# Patient Record
Sex: FEMALE | Race: OTHER | HISPANIC OR LATINO
[De-identification: names, ages, dates, MRNs, and addresses within clinical notes are randomized per-mention and may not be internally consistent; named-entity substitution may affect disease eponyms.]

---

## 2019-02-01 ENCOUNTER — RECORD ABSTRACTING (OUTPATIENT)
Age: 49
End: 2019-02-01

## 2019-02-01 DIAGNOSIS — Z80.1 FAMILY HISTORY OF MALIGNANT NEOPLASM OF TRACHEA, BRONCHUS AND LUNG: ICD-10-CM

## 2019-02-01 DIAGNOSIS — Z87.59 PERSONAL HISTORY OF OTHER COMPLICATIONS OF PREGNANCY, CHILDBIRTH AND THE PUERPERIUM: ICD-10-CM

## 2019-02-01 DIAGNOSIS — Z82.3 FAMILY HISTORY OF STROKE: ICD-10-CM

## 2019-02-01 DIAGNOSIS — Z98.890 OTHER SPECIFIED POSTPROCEDURAL STATES: ICD-10-CM

## 2019-02-01 DIAGNOSIS — Z83.3 FAMILY HISTORY OF DIABETES MELLITUS: ICD-10-CM

## 2019-02-01 DIAGNOSIS — Z87.19 PERSONAL HISTORY OF OTHER DISEASES OF THE DIGESTIVE SYSTEM: ICD-10-CM

## 2019-02-01 DIAGNOSIS — Z82.49 FAMILY HISTORY OF ISCHEMIC HEART DISEASE AND OTHER DISEASES OF THE CIRCULATORY SYSTEM: ICD-10-CM

## 2019-02-01 LAB — CYTOLOGY CVX/VAG DOC THIN PREP: NORMAL

## 2019-02-05 ENCOUNTER — RX RENEWAL (OUTPATIENT)
Age: 49
End: 2019-02-05

## 2019-02-05 RX ORDER — BLOOD SUGAR DIAGNOSTIC
STRIP MISCELLANEOUS DAILY
Qty: 100 | Refills: 1 | Status: ACTIVE | COMMUNITY
Start: 2019-02-05 | End: 1900-01-01

## 2019-04-20 ENCOUNTER — LABORATORY RESULT (OUTPATIENT)
Age: 49
End: 2019-04-20

## 2019-04-22 ENCOUNTER — APPOINTMENT (OUTPATIENT)
Dept: ENDOCRINOLOGY | Facility: CLINIC | Age: 49
End: 2019-04-22
Payer: MEDICARE

## 2019-04-22 VITALS
WEIGHT: 220 LBS | HEIGHT: 66.5 IN | SYSTOLIC BLOOD PRESSURE: 124 MMHG | BODY MASS INDEX: 34.94 KG/M2 | HEART RATE: 76 BPM | DIASTOLIC BLOOD PRESSURE: 70 MMHG

## 2019-04-22 LAB — GLUCOSE BLDC GLUCOMTR-MCNC: 109

## 2019-04-22 PROCEDURE — 82962 GLUCOSE BLOOD TEST: CPT

## 2019-04-22 PROCEDURE — 99214 OFFICE O/P EST MOD 30 MIN: CPT | Mod: 25

## 2019-04-22 NOTE — PHYSICAL EXAM
[Alert] : alert [No Acute Distress] : no acute distress [Well Nourished] : well nourished [Well Developed] : well developed [Normal Sclera/Conjunctiva] : normal sclera/conjunctiva [EOMI] : extra ocular movement intact [Normal Oropharynx] : the oropharynx was normal [No Proptosis] : no proptosis [Thyroid Not Enlarged] : the thyroid was not enlarged [No Thyroid Nodules] : there were no palpable thyroid nodules [No Respiratory Distress] : no respiratory distress [Clear to Auscultation] : lungs were clear to auscultation bilaterally [No Accessory Muscle Use] : no accessory muscle use [Normal Rate] : heart rate was normal  [Normal S1, S2] : normal S1 and S2 [Regular Rhythm] : with a regular rhythm [Pedal Pulses Normal] : the pedal pulses are present [Normal Bowel Sounds] : normal bowel sounds [No Edema] : there was no peripheral edema [Soft] : abdomen soft [Not Tender] : non-tender [Not Distended] : not distended [Post Cervical Nodes] : posterior cervical nodes [Anterior Cervical Nodes] : anterior cervical nodes [Normal] : normal and non tender [Axillary Nodes] : axillary nodes [Spine Straight] : spine straight [No Spinal Tenderness] : no spinal tenderness [No Stigmata of Cushings Syndrome] : no stigmata of cushings syndrome [Normal Strength/Tone] : muscle strength and tone were normal [Normal Gait] : normal gait [Acanthosis Nigricans] : no acanthosis nigricans [No Rash] : no rash [Normal Reflexes] : deep tendon reflexes were 2+ and symmetric [No Tremors] : no tremors [Oriented x3] : oriented to person, place, and time

## 2019-04-22 NOTE — HISTORY OF PRESENT ILLNESS
[FreeTextEntry1] :   48 yo  Lady coming for f/u DM2, goiter\par        lost 6lb since last visit\par        labs reviewed better HgA1c, better LFTs\par        liver US and thyroid US reviewed\par        both her mother and father with DM2 on insulin\par        her mother had recent stroke and toe amputated sees Dr Mcmahan\par        had vomiting and diarrhea for 3 days had a stomach infection, better now , started last Saturday per pt \par        has 3 kids, had GDM with the first 2 pregnancies, \par        type 2 DM for 6 years since 2010 when she was admitted with sepsis + blood cultures, N/V at [facility] complicated by neuropathy s/p multiple surgeries for an infection in her right foot x2 years, resolved 7months ago\par        on\par        Janumet 50-1000mg 1tab bid with no side effects \par        Glipizide XL 5mg before dinner once a day \par        Feeling well, denies hypoglycemia. No side effects from medications. \par        Last A1c 5.7% on 10/2016, prior HgA1c on 10/15 was 5.8%, 4/2015 was 6.3% , 8/2014 was 8.3%, used to be 5.3-7.2%\par        Checks BS 2-3 times per week again no records, per pt :\par        175 highest after going out for dinner, 120 this morning per pt, lowest 98 the other day per pt \par        Microvascular complications: \par        Nephropathy: +1 proteins in the urine 8/2014, Cr 1.1 EGFR 54, microalb 178 ( <17), 5/16 EGFR 44, Cr 1.3, microalb 23, 10/2016: Cr 1.3, egfr 44 (10/16), microalb 10\par        Neuropathy, sees [facility] 10/2016 due in april , denies tingling, numbness, checks her feet every evening, had a flap x 3 done 3 years ago due to wrong hard brace per pt, none with her new brace \par        microfilament exam pt refused today again "I am fine and it is so hard to remove my prosthesis" , pt has prosthesis, sees Podiatry every 6 months \par        Retinopathy denies: last eye exam done 4/14/2015 Dr Taylor in [place], due to see him\par        Pt having dental work done this month, needs 6 teeth extracted and getting partial dentures. \par        Macrovascular complications: \par        HTN at goal not on meds\par        CAD/CVA denies Dr [facility] last seen 2yrs ago per pt same buiding with Dr Desouza her PCP , had also a Holter Monitor 2010 due to have a physical\par        Lipids at goal not on meds with LFT's normal\par        thyroid US 4/2015 right lower pole 3.5mm focal hypoechoic nodule\par        Thyroid US 10/2016 Right lower pole hypoechoic nodule measuring 3.4 x 3.4 x 1.9 mm unchanged and an adjacent similar sized but new hypoechoic nodule as well. \par        Left upper pole hypoechoic nodule measures 3.2 x 2.1 \par        x 1.1 mm. This is not significantly changed. \par        labs reviewed with the pt.

## 2019-05-28 ENCOUNTER — RX RENEWAL (OUTPATIENT)
Age: 49
End: 2019-05-28

## 2019-07-19 ENCOUNTER — LABORATORY RESULT (OUTPATIENT)
Age: 49
End: 2019-07-19

## 2019-07-22 ENCOUNTER — APPOINTMENT (OUTPATIENT)
Dept: ENDOCRINOLOGY | Facility: CLINIC | Age: 49
End: 2019-07-22
Payer: MEDICARE

## 2019-07-22 VITALS
HEIGHT: 66.5 IN | BODY MASS INDEX: 33.99 KG/M2 | SYSTOLIC BLOOD PRESSURE: 100 MMHG | HEART RATE: 76 BPM | DIASTOLIC BLOOD PRESSURE: 60 MMHG | WEIGHT: 214 LBS

## 2019-07-22 DIAGNOSIS — E66.09 OTHER OBESITY DUE TO EXCESS CALORIES: ICD-10-CM

## 2019-07-22 LAB — GLUCOSE BLDC GLUCOMTR-MCNC: 110

## 2019-07-22 PROCEDURE — 99215 OFFICE O/P EST HI 40 MIN: CPT | Mod: 25

## 2019-07-22 PROCEDURE — G0447 BEHAVIOR COUNSEL OBESITY 15M: CPT | Mod: 59

## 2019-07-22 PROCEDURE — 82962 GLUCOSE BLOOD TEST: CPT

## 2019-07-22 NOTE — PHYSICAL EXAM
[Alert] : alert [No Acute Distress] : no acute distress [Well Nourished] : well nourished [Well Developed] : well developed [Normal Sclera/Conjunctiva] : normal sclera/conjunctiva [EOMI] : extra ocular movement intact [No Proptosis] : no proptosis [Normal Oropharynx] : the oropharynx was normal [Thyroid Not Enlarged] : the thyroid was not enlarged [No Thyroid Nodules] : there were no palpable thyroid nodules [No Respiratory Distress] : no respiratory distress [No Accessory Muscle Use] : no accessory muscle use [Clear to Auscultation] : lungs were clear to auscultation bilaterally [Normal Rate] : heart rate was normal  [Normal S1, S2] : normal S1 and S2 [Regular Rhythm] : with a regular rhythm [Pedal Pulses Normal] : the pedal pulses are present [No Edema] : there was no peripheral edema [Normal Bowel Sounds] : normal bowel sounds [Not Tender] : non-tender [Soft] : abdomen soft [Not Distended] : not distended [Post Cervical Nodes] : posterior cervical nodes [Axillary Nodes] : axillary nodes [Anterior Cervical Nodes] : anterior cervical nodes [No Spinal Tenderness] : no spinal tenderness [Spine Straight] : spine straight [No Stigmata of Cushings Syndrome] : no stigmata of cushings syndrome [Normal Gait] : normal gait [Normal Strength/Tone] : muscle strength and tone were normal [No Rash] : no rash [Right Foot Was Examined] : right foot ~C was examined [Left Foot Was Examined] : left foot ~C was examined [Normal] : normal [Full ROM] : with full range of motion [2+] : 2+ in the dorsalis pedis [Normal Reflexes] : deep tendon reflexes were 2+ and symmetric [No Tremors] : no tremors [Oriented x3] : oriented to person, place, and time [Acanthosis Nigricans] : no acanthosis nigricans [Vibration Dec.] : normal vibratory sensation at the level of the toes [Position Sense Dec.] : normal position sense at the level of the toes [Diminished Throughout Both Feet] : normal tactile sensation with monofilament testing throughout both feet

## 2019-07-22 NOTE — HISTORY OF PRESENT ILLNESS
[FreeTextEntry1] :   47 yo  Lady coming for f/u DM2, goiter\par        lost 6lb since last visit\par        labs reviewed better HgA1c, better LFTs\par        liver US and thyroid US reviewed\par        both her mother and father with DM2 on insulin\par        her mother had recent stroke and toe amputated sees Dr Mcmahan\par        had vomiting and diarrhea for 3 days had a stomach infection, better now , started last Saturday per pt \par        has 3 kids, had GDM with the first 2 pregnancies, \par        type 2 DM for 6 years since 2010 when she was admitted with sepsis + blood cultures, N/V at [place] complicated by neuropathy s/p multiple surgeries for an infection in her right foot x2 years, resolved 7months ago\par        on\par        Janumet 50-1000mg 1tab bid with no side effects \par        Glipizide XL 5mg before dinner once a day \par        Feeling well, denies hypoglycemia. No side effects from medications. \par        Last A1c 5.7% on 10/2016, prior HgA1c on 10/15 was 5.8%, 4/2015 was 6.3% , 8/2014 was 8.3%, used to be 5.3-7.2%\par        Checks BS 2-3 times per week again no records, per pt :\par        175 highest after going out for dinner, 120 this morning per pt, lowest 98 the other day per pt \par        Microvascular complications: \par        Nephropathy: +1 proteins in the urine 8/2014, Cr 1.1 EGFR 54, microalb 178 ( <17), 5/16 EGFR 44, Cr 1.3, microalb 23, 10/2016: Cr 1.3, egfr 44 (10/16), microalb 10\par        Neuropathy, sees [facility] 10/2016 due in april , denies tingling, numbness, checks her feet every evening, had a flap x 3 done 3 years ago due to wrong hard brace per pt, none with her new brace \par        microfilament exam pt refused today again "I am fine and it is so hard to remove my prosthesis" , pt has prosthesis, sees Podiatry every 6 months \par        Retinopathy denies: last eye exam done 4/14/2015 Dr Taylor in [place], due to see him\par        Pt having dental work done this month, needs 6 teeth extracted and getting partial dentures. \par        Macrovascular complications: \par        HTN at goal not on meds\par        CAD/CVA denies Dr [facility] last seen 2yrs ago per pt same buiding with Dr Desouza her PCP , had also a Holter Monitor 2010 due to have a physical\par        Lipids at goal not on meds with LFT's normal\par        thyroid US 4/2015 right lower pole 3.5mm focal hypoechoic nodule\par        Thyroid US 10/2016 Right lower pole hypoechoic nodule measuring 3.4 x 3.4 x 1.9 mm unchanged and an adjacent similar sized but new hypoechoic nodule as well. \par        Left upper pole hypoechoic nodule measures 3.2 x 2.1 \par        x 1.1 mm. This is not significantly changed. \par

## 2019-09-11 ENCOUNTER — RX CHANGE (OUTPATIENT)
Age: 49
End: 2019-09-11

## 2019-09-11 RX ORDER — LANCETS 33 GAUGE
EACH MISCELLANEOUS
Qty: 100 | Refills: 5 | Status: DISCONTINUED | COMMUNITY
Start: 2019-02-05 | End: 2019-09-11

## 2019-09-17 ENCOUNTER — APPOINTMENT (OUTPATIENT)
Dept: INTERNAL MEDICINE | Facility: CLINIC | Age: 49
End: 2019-09-17
Payer: MEDICARE

## 2019-09-17 VITALS — SYSTOLIC BLOOD PRESSURE: 105 MMHG | DIASTOLIC BLOOD PRESSURE: 70 MMHG

## 2019-09-17 VITALS — WEIGHT: 209 LBS | HEIGHT: 66.5 IN | BODY MASS INDEX: 33.19 KG/M2 | TEMPERATURE: 98 F

## 2019-09-17 DIAGNOSIS — Z23 ENCOUNTER FOR IMMUNIZATION: ICD-10-CM

## 2019-09-17 DIAGNOSIS — L20.9 ATOPIC DERMATITIS, UNSPECIFIED: ICD-10-CM

## 2019-09-17 DIAGNOSIS — R92.2 INCONCLUSIVE MAMMOGRAM: ICD-10-CM

## 2019-09-17 PROCEDURE — 36415 COLL VENOUS BLD VENIPUNCTURE: CPT

## 2019-09-17 PROCEDURE — 90715 TDAP VACCINE 7 YRS/> IM: CPT | Mod: GY

## 2019-09-17 PROCEDURE — 90732 PPSV23 VACC 2 YRS+ SUBQ/IM: CPT

## 2019-09-17 PROCEDURE — G0009: CPT | Mod: 59

## 2019-09-17 PROCEDURE — G0439: CPT

## 2019-09-17 PROCEDURE — 90471 IMMUNIZATION ADMIN: CPT | Mod: GY

## 2019-09-17 RX ORDER — TRIAMCINOLONE ACETONIDE 1 MG/G
0.1 CREAM TOPICAL
Qty: 1 | Refills: 3 | Status: ACTIVE | COMMUNITY
Start: 1900-01-01 | End: 1900-01-01

## 2019-09-17 NOTE — HEALTH RISK ASSESSMENT
[Never (0 pts)] : Never (0 points) [No falls in past year] : Patient reported no falls in the past year [No] : In the past 12 months have you used drugs other than those required for medical reasons? No [0] : 1) Little interest or pleasure doing things: Not at all (0) [HIV test declined] : HIV test declined [Hepatitis C test declined] : Hepatitis C test declined [None] : None [With Family] : lives with family [# of Members in Household ___] :  household currently consist of [unfilled] member(s) [College] : College [On disability] : on disability [] :  [# Of Children ___] : has [unfilled] children [Feels Safe at Home] : Feels safe at home [Fully functional (bathing, dressing, toileting, transferring, walking, feeding)] : Fully functional (bathing, dressing, toileting, transferring, walking, feeding) [Fully functional (using the telephone, shopping, preparing meals, housekeeping, doing laundry, using] : Fully functional and needs no help or supervision to perform IADLs (using the telephone, shopping, preparing meals, housekeeping, doing laundry, using transportation, managing medications and managing finances) [Reports changes in vision] : Reports changes in vision [Smoke Detector] : smoke detector [Carbon Monoxide Detector] : carbon monoxide detector [Sunscreen] : uses sunscreen [Seat Belt] :  uses seat belt [Patient/Caregiver not ready to engage] : Patient/Caregiver not ready to engage [Patient reported mammogram was normal] : Patient reported mammogram was normal [Patient reported PAP Smear was normal] : Patient reported PAP Smear was normal [] : No [de-identified] : normal  [de-identified] : walks daily  [[id number]] : 0 [Change in mental status noted] : No change in mental status noted [Language] : denies difficulty with language [Behavior] : denies difficulty with behavior [Learning/Retaining New Information] : denies difficulty learning/retaining new information [Handling Complex Tasks] : denies difficulty handling complex tasks [Reasoning] : denies difficulty with reasoning [Spatial Ability and Orientation] : denies difficulty with spatial ability and orientation [Reports changes in hearing] : Reports no changes in hearing [Reports normal functional visual acuity (ie: able to read med bottle)] : Reports poor functional visual acuity.  [Reports changes in dental health] : Reports no changes in dental health [Guns at Home] : no guns at home [Travel to Developing Areas] : does not  travel to developing areas [Safety elements used in home] : no safety elements used in home [Caregiver Concerns] : does not have caregiver concerns [MammogramDate] : 11/2018 [PapSmearDate] : 7/2018 [de-identified] : Year and half [ColonoscopyDate] : never [BoneDensityDate] : never [de-identified] : 2018 [de-identified] : cataract sx lf eye 10/2019 -last eye exam 8/2019 [AdvancecareDate] : 09/2019

## 2019-09-17 NOTE — PHYSICAL EXAM
[No Acute Distress] : no acute distress [Well Nourished] : well nourished [Well Developed] : well developed [PERRL] : pupils equal round and reactive to light [Normal Sclera/Conjunctiva] : normal sclera/conjunctiva [Well-Appearing] : well-appearing [EOMI] : extraocular movements intact [Normal Outer Ear/Nose] : the outer ears and nose were normal in appearance [No JVD] : no jugular venous distention [Normal Oropharynx] : the oropharynx was normal [No Lymphadenopathy] : no lymphadenopathy [Supple] : supple [No Respiratory Distress] : no respiratory distress  [Thyroid Normal, No Nodules] : the thyroid was normal and there were no nodules present [No Accessory Muscle Use] : no accessory muscle use [Clear to Auscultation] : lungs were clear to auscultation bilaterally [Normal Rate] : normal rate  [Regular Rhythm] : with a regular rhythm [Normal S1, S2] : normal S1 and S2 [No Carotid Bruits] : no carotid bruits [No Murmur] : no murmur heard [No Abdominal Bruit] : a ~M bruit was not heard ~T in the abdomen [No Varicosities] : no varicosities [Pedal Pulses Present] : the pedal pulses are present [No Palpable Aorta] : no palpable aorta [No Edema] : there was no peripheral edema [Soft] : abdomen soft [No Extremity Clubbing/Cyanosis] : no extremity clubbing/cyanosis [Non Tender] : non-tender [Non-distended] : non-distended [No HSM] : no HSM [No Masses] : no abdominal mass palpated [Normal Bowel Sounds] : normal bowel sounds [Normal Posterior Cervical Nodes] : no posterior cervical lymphadenopathy [No CVA Tenderness] : no CVA  tenderness [Normal Anterior Cervical Nodes] : no anterior cervical lymphadenopathy [No Spinal Tenderness] : no spinal tenderness [No Joint Swelling] : no joint swelling [Grossly Normal Strength/Tone] : grossly normal strength/tone [No Rash] : no rash [No Focal Deficits] : no focal deficits [Coordination Grossly Intact] : coordination grossly intact [Normal Gait] : normal gait [Normal Affect] : the affect was normal [Deep Tendon Reflexes (DTR)] : deep tendon reflexes were 2+ and symmetric [Normal Insight/Judgement] : insight and judgment were intact

## 2019-09-17 NOTE — HISTORY OF PRESENT ILLNESS
[FreeTextEntry1] : annual wellness [de-identified] : Patient is a 48F with a hx of DM2, goiter, right foot deformity presents today for annual physical exam.\par Feels well overall without complaints today\par Left cataract being corrected on october 15

## 2019-09-18 ENCOUNTER — RX RENEWAL (OUTPATIENT)
Age: 49
End: 2019-09-18

## 2019-09-23 ENCOUNTER — NON-APPOINTMENT (OUTPATIENT)
Age: 49
End: 2019-09-23

## 2019-09-23 ENCOUNTER — APPOINTMENT (OUTPATIENT)
Dept: INTERNAL MEDICINE | Facility: CLINIC | Age: 49
End: 2019-09-23
Payer: MEDICARE

## 2019-09-23 VITALS
HEIGHT: 66 IN | SYSTOLIC BLOOD PRESSURE: 118 MMHG | WEIGHT: 209 LBS | DIASTOLIC BLOOD PRESSURE: 60 MMHG | BODY MASS INDEX: 33.59 KG/M2 | TEMPERATURE: 98.2 F

## 2019-09-23 PROCEDURE — 99214 OFFICE O/P EST MOD 30 MIN: CPT | Mod: 25

## 2019-09-23 PROCEDURE — 93000 ELECTROCARDIOGRAM COMPLETE: CPT

## 2019-09-23 PROCEDURE — 36415 COLL VENOUS BLD VENIPUNCTURE: CPT

## 2019-09-23 NOTE — PHYSICAL EXAM
[Normal Sclera/Conjunctiva] : normal sclera/conjunctiva [Normal Outer Ear/Nose] : the outer ears and nose were normal in appearance [No Edema] : there was no peripheral edema [No Rash] : no rash [Normal] : affect was normal and insight and judgment were intact

## 2019-09-23 NOTE — ASSESSMENT
[High Risk Surgery - Intraperitoneal, Intrathoracic or Supringuinal Vascular Procedures] : High Risk Surgery - Intraperitoneal, Intrathoracic or Supringuinal Vascular Procedures - No (0) [Ischemic Heart Disease] : Ischemic Heart Disease - No (0) [Congestive Heart Failure] : Congestive Heart Failure - No (0) [Creatinine >= 2mg/dL (1 Point)] : Creatinine >= 2mg/dL - No (0) [Prior Cerebrovascular Accident or TIA] : Prior Cerebrovascular Accident or TIA - No (0) [Insulin-dependent Diabetic (1 Point)] : Insulin-dependent Diabetic - No (0) [0] : 0 , RCRI Class: I, Risk of Post-Op Cardiac Complications: 0.4%, Procedure Risk: Low-Risk

## 2019-09-25 NOTE — HISTORY OF PRESENT ILLNESS
[No Pertinent Cardiac History] : no history of aortic stenosis, atrial fibrillation, coronary artery disease, recent myocardial infarction, or implantable device/pacemaker [No Pertinent Pulmonary History] : no history of asthma, COPD, sleep apnea, or smoking [No Adverse Anesthesia Reaction] : no adverse anesthesia reaction in self or family member [Diabetes] : diabetes [(Patient denies any chest pain, claudication, dyspnea on exertion, orthopnea, palpitations or syncope)] : Patient denies any chest pain, claudication, dyspnea on exertion, orthopnea, palpitations or syncope [Moderate (4-6 METs)] : Moderate (4-6 METs) [Chronic Anticoagulation] : no chronic anticoagulation [Chronic Kidney Disease] : no chronic kidney disease [FreeTextEntry1] : left cataract surgery [FreeTextEntry2] : 10/15/2019 [FreeTextEntry3] : Dr. Umana [FreeTextEntry4] : Patient is a 48F with a hx of DM2, goiter, right foot deformity presents today for pre-op eval\par Feels well overall without complaints today

## 2019-09-25 NOTE — RESULTS/DATA
[] : results reviewed [Normal] : The 12 - lead ECG is normal [T Waves Inverted] : the T waves are inverted [NSR] : normal sinus rhythm [III] : III [de-identified] : normal [de-identified] : normal [FreeTextEntry1] : 76bpm

## 2019-09-26 LAB
ALBUMIN SERPL ELPH-MCNC: 4.2 G/DL
ALP BLD-CCNC: 39 U/L
ALT SERPL-CCNC: 20 U/L
ANION GAP SERPL CALC-SCNC: 13 MMOL/L
AST SERPL-CCNC: 20 U/L
BASOPHILS # BLD AUTO: 0.04 K/UL
BASOPHILS NFR BLD AUTO: 0.4 %
BILIRUB SERPL-MCNC: 0.4 MG/DL
BUN SERPL-MCNC: 19 MG/DL
CALCIUM SERPL-MCNC: 9.5 MG/DL
CHLORIDE SERPL-SCNC: 105 MMOL/L
CO2 SERPL-SCNC: 20 MMOL/L
CREAT SERPL-MCNC: 1.05 MG/DL
EOSINOPHIL # BLD AUTO: 0.15 K/UL
EOSINOPHIL NFR BLD AUTO: 1.6 %
GLUCOSE SERPL-MCNC: 84 MG/DL
HCT VFR BLD CALC: 40.4 %
HGB BLD-MCNC: 12.6 G/DL
IMM GRANULOCYTES NFR BLD AUTO: 0.4 %
LYMPHOCYTES # BLD AUTO: 2.2 K/UL
LYMPHOCYTES NFR BLD AUTO: 23.8 %
MAN DIFF?: NORMAL
MCHC RBC-ENTMCNC: 31.2 GM/DL
MCHC RBC-ENTMCNC: 32.7 PG
MCV RBC AUTO: 104.9 FL
MONOCYTES # BLD AUTO: 0.61 K/UL
MONOCYTES NFR BLD AUTO: 6.6 %
NEUTROPHILS # BLD AUTO: 6.22 K/UL
NEUTROPHILS NFR BLD AUTO: 67.2 %
PLATELET # BLD AUTO: 300 K/UL
POTASSIUM SERPL-SCNC: 5 MMOL/L
PROT SERPL-MCNC: 6.7 G/DL
RBC # BLD: 3.85 M/UL
RBC # FLD: 14.2 %
SODIUM SERPL-SCNC: 138 MMOL/L
WBC # FLD AUTO: 9.26 K/UL

## 2019-10-15 ENCOUNTER — HOSPITAL ENCOUNTER (OUTPATIENT)
Dept: HOSPITAL 74 - FASU | Age: 49
Discharge: HOME | End: 2019-10-15
Attending: OPHTHALMOLOGY
Payer: COMMERCIAL

## 2019-10-15 VITALS — SYSTOLIC BLOOD PRESSURE: 122 MMHG | HEART RATE: 79 BPM | DIASTOLIC BLOOD PRESSURE: 65 MMHG

## 2019-10-15 VITALS — TEMPERATURE: 98.3 F

## 2019-10-15 VITALS — BODY MASS INDEX: 33.3 KG/M2

## 2019-10-15 DIAGNOSIS — H57.052: ICD-10-CM

## 2019-10-15 DIAGNOSIS — H25.22: Primary | ICD-10-CM

## 2019-10-15 PROCEDURE — 08RK3JZ REPLACEMENT OF LEFT LENS WITH SYNTHETIC SUBSTITUTE, PERCUTANEOUS APPROACH: ICD-10-PCS | Performed by: OPHTHALMOLOGY

## 2019-10-15 RX ADMIN — OFLOXACIN SCH DROP: 3 SOLUTION/ DROPS OPHTHALMIC at 07:15

## 2019-10-15 RX ADMIN — KETOROLAC TROMETHAMINE SCH DROP: 5 SOLUTION OPHTHALMIC at 07:05

## 2019-10-15 RX ADMIN — KETOROLAC TROMETHAMINE SCH DROP: 5 SOLUTION OPHTHALMIC at 07:00

## 2019-10-15 RX ADMIN — CYCLOPENTOLATE HYDROCHLORIDE SCH DROP: 10 SOLUTION/ DROPS OPHTHALMIC at 07:15

## 2019-10-15 RX ADMIN — OFLOXACIN SCH DROP: 3 SOLUTION/ DROPS OPHTHALMIC at 07:00

## 2019-10-15 RX ADMIN — CYCLOPENTOLATE HYDROCHLORIDE SCH DROP: 10 SOLUTION/ DROPS OPHTHALMIC at 07:00

## 2019-10-15 RX ADMIN — TROPICAMIDE SCH DROP: 10 SOLUTION/ DROPS OPHTHALMIC at 07:10

## 2019-10-15 RX ADMIN — CYCLOPENTOLATE HYDROCHLORIDE SCH DROP: 10 SOLUTION/ DROPS OPHTHALMIC at 07:05

## 2019-10-15 RX ADMIN — TROPICAMIDE SCH DROP: 10 SOLUTION/ DROPS OPHTHALMIC at 07:05

## 2019-10-15 RX ADMIN — KETOROLAC TROMETHAMINE SCH DROP: 5 SOLUTION OPHTHALMIC at 07:10

## 2019-10-15 RX ADMIN — CYCLOPENTOLATE HYDROCHLORIDE SCH DROP: 10 SOLUTION/ DROPS OPHTHALMIC at 07:20

## 2019-10-15 RX ADMIN — OFLOXACIN SCH DROP: 3 SOLUTION/ DROPS OPHTHALMIC at 07:20

## 2019-10-15 RX ADMIN — PHENYLEPHRINE HYDROCHLORIDE SCH DROP: 0.25 SPRAY NASAL at 07:10

## 2019-10-15 RX ADMIN — TROPICAMIDE SCH DROP: 10 SOLUTION/ DROPS OPHTHALMIC at 07:00

## 2019-10-15 RX ADMIN — PHENYLEPHRINE HYDROCHLORIDE SCH DROP: 0.25 SPRAY NASAL at 07:15

## 2019-10-15 RX ADMIN — CYCLOPENTOLATE HYDROCHLORIDE SCH DROP: 10 SOLUTION/ DROPS OPHTHALMIC at 07:10

## 2019-10-15 RX ADMIN — PHENYLEPHRINE HYDROCHLORIDE SCH DROP: 0.25 SPRAY NASAL at 07:20

## 2019-10-15 RX ADMIN — OFLOXACIN SCH DROP: 3 SOLUTION/ DROPS OPHTHALMIC at 07:10

## 2019-10-15 RX ADMIN — KETOROLAC TROMETHAMINE SCH DROP: 5 SOLUTION OPHTHALMIC at 07:20

## 2019-10-15 RX ADMIN — TROPICAMIDE SCH DROP: 10 SOLUTION/ DROPS OPHTHALMIC at 07:20

## 2019-10-15 RX ADMIN — OFLOXACIN SCH DROP: 3 SOLUTION/ DROPS OPHTHALMIC at 07:05

## 2019-10-15 RX ADMIN — TROPICAMIDE SCH DROP: 10 SOLUTION/ DROPS OPHTHALMIC at 07:15

## 2019-10-15 RX ADMIN — PHENYLEPHRINE HYDROCHLORIDE SCH DROP: 0.25 SPRAY NASAL at 07:00

## 2019-10-15 RX ADMIN — KETOROLAC TROMETHAMINE SCH DROP: 5 SOLUTION OPHTHALMIC at 07:15

## 2019-10-15 RX ADMIN — PHENYLEPHRINE HYDROCHLORIDE SCH DROP: 0.25 SPRAY NASAL at 07:05

## 2019-10-15 NOTE — OP
DATE OF OPERATION:  10/15/2019

 

PREOPERATIVE DIAGNOSIS:  Hypermature cataract, myotic pupil, left eye.  

 

POSTOPERATIVE DIAGNOSIS:   Hypermature cataract, myotic pupil, left eye.  

 

PROCEDURE:  Hypercataract extraction via phacoemulsification with insertion of

posterior chamber lens implant, use of Trypan Blue, use of iris hooks.  

 

SURGEON:  Aditya Marcial MD  

 

ASSISTANT:  Stefanie Hernandez MD

 

ANESTHESIA:  Topical with sedation. 

 

ESTIMATED BLOOD LOSS:  Less than 1 mL

 

COMPLICATIONS:  None.  

 

SPECIMENS:  None.  

 

PROCEDURE:  The patient was identified in the holding area.  After all the risks,

benefits, and alternatives were explained to the patient, informed consent was

obtained.  The left eye was marked with the marking pen.  The patient then entered

the operating room on an eye stretcher.  After a formal time-out was performed,

topical tetracaine eye drops were instilled onto the left eye.  The left eye was then

prepped and draped in the usual sterile fashion.  An eyelid speculum was placed

beneath the eyelids of the left eye.  A inferotemporal paracentesis incision was

created using a 15-degree blade.  Topical preservative-free epinephrine and

preservative-free lidocaine were then injected into the anterior chamber. Because the

pupil still maintained myosis at about 3 to 4 mm, 5 equally spaced paracentesis

incisions were created using a 15-degree blade and an iris hook was inserted through

each of the 5 paracentesis incisions to capture and dilate the pupil to about 6 to 7

mm.  Then, intracameral air bubble was injected and Trypan Blue was injected into the

eye to stain the anterior capsule.  BSS was then injected to irrigate any excess

Trypan Blue out of the eye.  Viscoelastic was then used to replace the air bubble in

the anterior chamber.  A 2.4-mm keratome blade was then used to make a superotemporal

incision.  A 360-degree continuous curvilinear capsulorrhexis was then created using

bent cystotome and Utrata forceps.  Hydrodissection was performed using balanced

saline solution on a cannula.  Phacoemulsification was introduced to disassemble and

remove the nucleus in its entirety.  Irrigation/aspiration was then used to remove

any remaining cortical material from the eye.  The capsular bag was reformed using

viscoelastic.  An Travis model [id number] with a power of 19.5 diopters, serial number

[id number], was inspected and found to be defect-free and injected into the capsular

bag.  Irrigation/aspiration was then used to remove any remaining viscoelastic from

the eye.  All iris hooks were then removed from the eye and then

irrigation/aspiration was then used to remove any remaining residual debris from the

eye.  The balanced saline solution was used to reform the anterior chamber.  All

wounds were hydrated with balanced saline solution and noted to be watertight.  The

anterior chamber was deep.  The lens was perfectly centered in the capsular bag. 

There was a red reflex present and the eye had adequate pressure.  Topical antibiotic

eye drops and ointment were then administered to the left eye.  The eye speculum was

removed from the left eye.  The left eye was shielded.  The patient tolerated the

procedure well and left the operating room in stable condition to follow up in the

Eye Clinic tomorrow morning at 10:00.  

 

 

ADITYA MARCIAL M.D.

 

[id number]

DD: 10/15/2019 09:33

DT: 10/15/2019 11:00

Job #:  [id number]

## 2019-10-21 ENCOUNTER — APPOINTMENT (OUTPATIENT)
Dept: ENDOCRINOLOGY | Facility: CLINIC | Age: 49
End: 2019-10-21

## 2019-10-23 ENCOUNTER — RX RENEWAL (OUTPATIENT)
Age: 49
End: 2019-10-23

## 2019-11-25 ENCOUNTER — RX CHANGE (OUTPATIENT)
Age: 49
End: 2019-11-25

## 2019-11-26 ENCOUNTER — RX CHANGE (OUTPATIENT)
Age: 49
End: 2019-11-26

## 2019-11-27 ENCOUNTER — RX RENEWAL (OUTPATIENT)
Age: 49
End: 2019-11-27

## 2020-04-14 ENCOUNTER — APPOINTMENT (OUTPATIENT)
Dept: ENDOCRINOLOGY | Facility: CLINIC | Age: 50
End: 2020-04-14

## 2020-06-29 ENCOUNTER — RX RENEWAL (OUTPATIENT)
Age: 50
End: 2020-06-29

## 2020-08-02 ENCOUNTER — RESULT REVIEW (OUTPATIENT)
Age: 50
End: 2020-08-02

## 2020-08-03 ENCOUNTER — RESULT REVIEW (OUTPATIENT)
Age: 50
End: 2020-08-03

## 2020-08-05 ENCOUNTER — RESULT REVIEW (OUTPATIENT)
Age: 50
End: 2020-08-05

## 2020-08-17 ENCOUNTER — APPOINTMENT (OUTPATIENT)
Dept: PODIATRY | Facility: CLINIC | Age: 50
End: 2020-08-17
Payer: MEDICARE

## 2020-08-17 VITALS — WEIGHT: 209 LBS | BODY MASS INDEX: 33.59 KG/M2 | HEIGHT: 66 IN

## 2020-08-17 PROCEDURE — 99024 POSTOP FOLLOW-UP VISIT: CPT

## 2020-08-17 NOTE — PROCEDURE
[FreeTextEntry1] : A dry sterile dressing was applied and patient was advised to stay in the appropriate shoe gear and offloading until further notice.\par A lengthy discussion with the patient at this time regarding their current diagnosis, surgical status and prognosis. I advised the patient that if they continue to follow my postoperative instructions regarding limited progressive weight bearing and activity level as well as continued formal physical therapy as well as physical therapy at home coupled with continued ice, elevation and offloading the surgical site they will continue to progress as expected. If, however, the patient does not follow my instructions and exhibits medical non compliance they run the risk of a prolonged post op period to return to normal function re: shoegear and activity level as well relief of symptoms and surgical results will be less than expected.\par follow up appt 2 weeks\par

## 2020-08-17 NOTE — PHYSICAL EXAM
[General Appearance - Alert] : alert [General Appearance - In No Acute Distress] : in no acute distress [3+] : left foot posterior tibialis 3+ [2+] : left foot dorsalis pedis 2+ [Skin Color & Pigmentation] : normal skin color and pigmentation [4 x 4] : 4 x 4  [Abdominal Pad] : Abdominal Pad [Kerlix] : Kerlix [Ankle Swelling (On Exam)] : not present [Varicose Veins Of Lower Extremities] : not present [] : not present [Delayed in the Right Toes] : capillary refills normal in right toes [Delayed in the Left Toes] : capillary refills normal in the left toes [FreeTextEntry1] : plantar arch [de-identified] : VAC [de-identified] : PF exposed [de-identified] : used today, VNS notified to restart vac today [TWNoteComboBox1] : Left [TWNoteComboBox3] : FT [TWNoteComboBox2] : 3 [TWNoteComboBox5] : No [TWNoteComboBox6] : Surgical [de-identified] : No [de-identified] : Erythema [de-identified] : None [de-identified] : None [de-identified] : >75% [de-identified] : No [de-identified] : 3x Weekly [de-identified] : False

## 2020-08-17 NOTE — REVIEW OF SYSTEMS
[As Noted in HPI] : as noted in HPI [Fever] : no fever [Chills] : no chills [Leg Claudication] : no intermittent leg claudication [Lower Ext Edema] : no lower extremity edema [Shortness Of Breath] : no shortness of breath

## 2020-08-17 NOTE — HISTORY OF PRESENT ILLNESS
[Other: ____] : [unfilled] [Wheelchair] : a wheelchair [FreeTextEntry1] : about 2 weeks post op, doing well, no complaints

## 2020-08-17 NOTE — REASON FOR VISIT
[Foot Deformity] : foot deformity [Post Operative Visit] : a post operative visit for [Spouse] : spouse [FreeTextEntry2] : left foot charcot ostectomy, soft tissue fasciotomy

## 2020-08-31 ENCOUNTER — APPOINTMENT (OUTPATIENT)
Dept: PODIATRY | Facility: CLINIC | Age: 50
End: 2020-08-31
Payer: MEDICARE

## 2020-08-31 VITALS — WEIGHT: 209 LBS | HEIGHT: 66 IN | BODY MASS INDEX: 33.59 KG/M2

## 2020-08-31 PROCEDURE — 99024 POSTOP FOLLOW-UP VISIT: CPT

## 2020-08-31 NOTE — PHYSICAL EXAM
[General Appearance - Alert] : alert [General Appearance - In No Acute Distress] : in no acute distress [4 x 4] : 4 x 4  [Abdominal Pad] : Abdominal Pad [Kerlix] : Kerlix [Ankle Swelling (On Exam)] : not present [] : not present [Varicose Veins Of Lower Extremities] : not present [FreeTextEntry3] : No updates since prior visit and prior exam [FreeTextEntry1] : plantar arch [de-identified] : 100%GT [de-identified] : solosite [de-identified] : VAC [de-identified] : will begin to prep for apligraft if approved [TWNoteComboBox3] : FT [TWNoteComboBox1] : Left [TWNoteComboBox2] : 2 [TWNoteComboBox5] : No [de-identified] : Erythema [TWNoteComboBox6] : Surgical [de-identified] : No [de-identified] : None [de-identified] : None [de-identified] : 100% [de-identified] : No [de-identified] : 3x Weekly

## 2020-08-31 NOTE — REASON FOR VISIT
[Post Operative Visit] : a post operative visit for [Foot Deformity] : foot deformity [Spouse] : spouse [FreeTextEntry2] : left foot charcot ostectomy, soft tissue fasciotomy

## 2020-08-31 NOTE — PROCEDURE
[FreeTextEntry1] : A dry sterile dressing w/ solosite was applied and patient was advised to stay NWB 24/7\par \par follow up appt 2 weeks\par

## 2020-08-31 NOTE — HISTORY OF PRESENT ILLNESS
[Other: ____] : [unfilled] [Wheelchair] : a wheelchair [FreeTextEntry1] : patient NWB in wheelchair, VAC in place doing well, no complaints

## 2020-09-14 ENCOUNTER — APPOINTMENT (OUTPATIENT)
Dept: PODIATRY | Facility: CLINIC | Age: 50
End: 2020-09-14
Payer: MEDICARE

## 2020-09-14 VITALS — WEIGHT: 209 LBS | BODY MASS INDEX: 33.59 KG/M2 | HEIGHT: 66 IN

## 2020-09-14 PROCEDURE — 99024 POSTOP FOLLOW-UP VISIT: CPT

## 2020-09-14 NOTE — PHYSICAL EXAM
[General Appearance - Alert] : alert [General Appearance - In No Acute Distress] : in no acute distress [4 x 4] : 4 x 4  [Kerlix] : Kerlix [Abdominal Pad] : Abdominal Pad [3+] : left foot posterior tibialis 3+ [2+] : right foot dorsalis pedis 2+ [Ankle Swelling (On Exam)] : not present [Delayed in the Right Toes] : capillary refills normal in right toes [] : not present [Varicose Veins Of Lower Extremities] : not present [Delayed in the Left Toes] : capillary refills normal in the left toes [de-identified] : ELIZABETH LLE, brace being worn on the right [de-identified] : 100%GT [de-identified] : Upon evaluating the sx site, it appears that a piece of adaptic has been left in for quite some time as the foot has begun to heal obver the adaptic. I needed to debride the wound and remove as much adaptic (broken down and frayed) as I could but there is no way to tell without making incision to the area how much if any is still there. [FreeTextEntry1] : plantar arch [de-identified] : will begin to prep for apligraft if approved [de-identified] : solosite [de-identified] : VAC [TWNoteComboBox3] : FT [TWNoteComboBox1] : Left [TWNoteComboBox2] : 2 [TWNoteComboBox5] : No [de-identified] : No [TWNoteComboBox6] : Surgical [de-identified] : Erythema [de-identified] : None [de-identified] : 100% [de-identified] : 3x Weekly [de-identified] : No [de-identified] : None

## 2020-09-14 NOTE — REVIEW OF SYSTEMS
[As Noted in HPI] : as noted in HPI [Negative] : Constitutional [Leg Claudication] : no intermittent leg claudication [Lower Ext Edema] : no lower extremity edema [Shortness Of Breath] : no shortness of breath

## 2020-09-14 NOTE — HISTORY OF PRESENT ILLNESS
[Other: ____] : [unfilled] [Wheelchair] : a wheelchair [FreeTextEntry1] : she states she is still NWB and VAC in place doing well, no complaints

## 2020-09-14 NOTE — PROCEDURE
[FreeTextEntry1] : As above re: adaptic and A dry sterile dressing w/ solosite was applied and patient was advised to stay NWB 24/7\par I feel at thyis time we could move forward with apligraft placement as a staged procedure post op\par follow up appt 2 weeks\par

## 2020-09-29 ENCOUNTER — APPOINTMENT (OUTPATIENT)
Dept: PODIATRY | Facility: CLINIC | Age: 50
End: 2020-09-29
Payer: MEDICARE

## 2020-09-29 VITALS — HEIGHT: 66 IN | BODY MASS INDEX: 33.59 KG/M2 | WEIGHT: 209 LBS

## 2020-09-29 PROCEDURE — 99024 POSTOP FOLLOW-UP VISIT: CPT

## 2020-09-29 NOTE — PHYSICAL EXAM
[General Appearance - Alert] : alert [General Appearance - In No Acute Distress] : in no acute distress [3+] : left foot posterior tibialis 3+ [2+] : left foot dorsalis pedis 2+ [4 x 4] : 4 x 4  [Abdominal Pad] : Abdominal Pad [Kerlix] : Kerlix [Ankle Swelling (On Exam)] : not present [Varicose Veins Of Lower Extremities] : not present [] : not present [Delayed in the Right Toes] : capillary refills normal in right toes [Delayed in the Left Toes] : capillary refills normal in the left toes [de-identified] : ELIZABETH LLE, brace being worn on the right [de-identified] : wound has completely remodeled after removal of adaptic which caused a defect [FreeTextEntry1] : plantar arch [de-identified] : 100%GT [FreeTextEntry2] : 8.5 [FreeTextEntry3] : 1.5 [FreeTextEntry4] : 0.1 [de-identified] : VAC [de-identified] : solosite, then restart vac [de-identified] : will begin to prep for apligraft if approved [TWNoteComboBox1] : Left [TWNoteComboBox2] : 2 [TWNoteComboBox3] : FT [TWNoteComboBox4] : None [TWNoteComboBox5] : No [TWNoteComboBox6] : Surgical [de-identified] : No [de-identified] : Normal [de-identified] : None [de-identified] : None [de-identified] : 100% [de-identified] : No [de-identified] : 3x Weekly

## 2020-09-29 NOTE — HISTORY OF PRESENT ILLNESS
[Other: ____] : [unfilled] [Wheelchair] : a wheelchair [FreeTextEntry1] : she states she is still NWB and VAC in place doing well, no complaints\par states she is NWB\par awaiting approval for apligraft

## 2020-09-29 NOTE — PROCEDURE
[FreeTextEntry1] : A dry sterile dressing w/ solosite was applied and patient was advised to stay NWB 24/7\par awaiting apligraft\par follow up appt 2 weeks\par

## 2020-10-12 ENCOUNTER — APPOINTMENT (OUTPATIENT)
Dept: PODIATRY | Facility: CLINIC | Age: 50
End: 2020-10-12

## 2020-12-17 ENCOUNTER — RX RENEWAL (OUTPATIENT)
Age: 50
End: 2020-12-17

## 2021-01-07 ENCOUNTER — RESULT REVIEW (OUTPATIENT)
Age: 51
End: 2021-01-07

## 2021-01-14 ENCOUNTER — RESULT REVIEW (OUTPATIENT)
Age: 51
End: 2021-01-14

## 2021-01-18 ENCOUNTER — RX RENEWAL (OUTPATIENT)
Age: 51
End: 2021-01-18

## 2021-01-19 ENCOUNTER — RESULT REVIEW (OUTPATIENT)
Age: 51
End: 2021-01-19

## 2021-01-20 ENCOUNTER — RESULT REVIEW (OUTPATIENT)
Age: 51
End: 2021-01-20

## 2021-03-04 ENCOUNTER — RESULT REVIEW (OUTPATIENT)
Age: 51
End: 2021-03-04

## 2021-06-14 ENCOUNTER — APPOINTMENT (OUTPATIENT)
Dept: FAMILY MEDICINE | Facility: CLINIC | Age: 51
End: 2021-06-14
Payer: MEDICARE

## 2021-06-14 ENCOUNTER — NON-APPOINTMENT (OUTPATIENT)
Age: 51
End: 2021-06-14

## 2021-06-14 VITALS
SYSTOLIC BLOOD PRESSURE: 120 MMHG | DIASTOLIC BLOOD PRESSURE: 70 MMHG | WEIGHT: 214 LBS | HEIGHT: 66 IN | BODY MASS INDEX: 34.39 KG/M2

## 2021-06-14 DIAGNOSIS — B95.8 LOCAL INFECTION OF THE SKIN AND SUBCUTANEOUS TISSUE, UNSPECIFIED: ICD-10-CM

## 2021-06-14 DIAGNOSIS — L08.9 LOCAL INFECTION OF THE SKIN AND SUBCUTANEOUS TISSUE, UNSPECIFIED: ICD-10-CM

## 2021-06-14 PROCEDURE — 99213 OFFICE O/P EST LOW 20 MIN: CPT

## 2021-06-14 RX ORDER — LORAZEPAM 0.5 MG
50 MCG TABLET ORAL
Refills: 0 | Status: DISCONTINUED | COMMUNITY
End: 2021-06-14

## 2021-06-14 NOTE — PHYSICAL EXAM
[de-identified] : right breast - upper right qudrant mildly tender to palpation. no skin changes.  [Normal] : affect was normal and insight and judgment were intact [de-identified] : multiple pustular lesions on erythematous bases in various stages of healing present in the right axilla. some have formed a yellowish crust. tender to palpation. left axilla - note one lesion with a pustular head.

## 2021-11-05 ENCOUNTER — RESULT REVIEW (OUTPATIENT)
Age: 51
End: 2021-11-05

## 2021-11-08 ENCOUNTER — APPOINTMENT (OUTPATIENT)
Dept: ENDOCRINOLOGY | Facility: CLINIC | Age: 51
End: 2021-11-08
Payer: MEDICARE

## 2021-11-08 VITALS
BODY MASS INDEX: 33.91 KG/M2 | SYSTOLIC BLOOD PRESSURE: 126 MMHG | HEIGHT: 66 IN | WEIGHT: 211 LBS | OXYGEN SATURATION: 98 % | DIASTOLIC BLOOD PRESSURE: 70 MMHG | HEART RATE: 78 BPM

## 2021-11-08 LAB
25(OH)D3 SERPL-MCNC: 32.5 NG/ML
ALBUMIN SERPL ELPH-MCNC: 4.2 G/DL
ALP BLD-CCNC: 46 U/L
ALT SERPL-CCNC: 64 U/L
ANION GAP SERPL CALC-SCNC: 14 MMOL/L
AST SERPL-CCNC: 53 U/L
BILIRUB SERPL-MCNC: 0.4 MG/DL
BUN SERPL-MCNC: 22 MG/DL
CALCIUM SERPL-MCNC: 9.9 MG/DL
CHLORIDE SERPL-SCNC: 103 MMOL/L
CHOLEST SERPL-MCNC: 174 MG/DL
CO2 SERPL-SCNC: 22 MMOL/L
CREAT SERPL-MCNC: 0.98 MG/DL
CREAT SPEC-SCNC: 102 MG/DL
ESTIMATED AVERAGE GLUCOSE: 197 MG/DL
GLUCOSE BLDC GLUCOMTR-MCNC: 126
GLUCOSE SERPL-MCNC: 146 MG/DL
HBA1C MFR BLD HPLC: 8.5 %
HDLC SERPL-MCNC: 38 MG/DL
LDLC SERPL CALC-MCNC: 97 MG/DL
MICROALBUMIN 24H UR DL<=1MG/L-MCNC: 2.6 MG/DL
MICROALBUMIN/CREAT 24H UR-RTO: 25 MG/G
NONHDLC SERPL-MCNC: 136 MG/DL
POTASSIUM SERPL-SCNC: 5 MMOL/L
PROT SERPL-MCNC: 6.9 G/DL
SODIUM SERPL-SCNC: 138 MMOL/L
T4 FREE SERPL-MCNC: 1.2 NG/DL
TRIGL SERPL-MCNC: 197 MG/DL
TSH SERPL-ACNC: 1.45 UIU/ML

## 2021-11-08 PROCEDURE — 95250 CONT GLUC MNTR PHYS/QHP EQP: CPT

## 2021-11-08 PROCEDURE — 99215 OFFICE O/P EST HI 40 MIN: CPT | Mod: 25

## 2021-11-08 PROCEDURE — 82962 GLUCOSE BLOOD TEST: CPT

## 2021-11-08 PROCEDURE — G0447 BEHAVIOR COUNSEL OBESITY 15M: CPT | Mod: 59

## 2021-11-08 RX ORDER — LANCETS 33 GAUGE
EACH MISCELLANEOUS
Qty: 200 | Refills: 1 | Status: ACTIVE | COMMUNITY
Start: 2021-11-08 | End: 1900-01-01

## 2021-11-08 RX ORDER — KETOROLAC TROMETHAMINE 5 MG/ML
0.5 SOLUTION OPHTHALMIC
Qty: 5 | Refills: 0 | Status: DISCONTINUED | COMMUNITY
Start: 2019-10-07 | End: 2021-11-08

## 2021-11-08 RX ORDER — PREDNISOLONE ACETATE 10 MG/ML
1 SUSPENSION/ DROPS OPHTHALMIC
Qty: 10 | Refills: 0 | Status: DISCONTINUED | COMMUNITY
Start: 2019-10-07 | End: 2021-11-08

## 2021-11-08 RX ORDER — BLOOD-GLUCOSE METER
W/DEVICE EACH MISCELLANEOUS
Qty: 1 | Refills: 0 | Status: ACTIVE | COMMUNITY
Start: 2021-11-08 | End: 1900-01-01

## 2021-11-22 ENCOUNTER — APPOINTMENT (OUTPATIENT)
Dept: ENDOCRINOLOGY | Facility: CLINIC | Age: 51
End: 2021-11-22
Payer: MEDICARE

## 2021-11-22 PROCEDURE — ZZZZZ: CPT

## 2021-11-28 NOTE — HISTORY OF PRESENT ILLNESS
[FreeTextEntry1] :   49 yo  Lady coming for f/u DM2, goiter\par        lost 6lb since last visit\par        labs reviewed better HgA1c, better LFTs\par        liver US and thyroid US reviewed\par        both her mother and father with DM2 on insulin\par        her mother had recent stroke and toe amputated sees Dr Mcmahan\par        had vomiting and diarrhea for 3 days had a stomach infection, better now , started last Saturday per pt \par        has 3 kids, had GDM with the first 2 pregnancies, \par        type 2 DM for 6 years since 2010 when she was admitted with sepsis + blood cultures, N/V at [facility] complicated by neuropathy s/p multiple surgeries for an infection in her right foot x2 years, resolved 7months ago\par        on\par        Janumet 50-1000mg 1tab bid with no side effects \par        Glipizide XL 5mg before dinner once a day \par        Feeling well, denies hypoglycemia. No side effects from medications. \par        Last A1c 5.7% on 10/2016, prior HgA1c on 10/15 was 5.8%, 4/2015 was 6.3% , 8/2014 was 8.3%, used to be 5.3-7.2%\par        Checks BS 2-3 times per week again no records, per pt :\par        175 highest after going out for dinner, 120 this morning per pt, lowest 98 the other day per pt \par        Microvascular complications: \par        Nephropathy: +1 proteins in the urine 8/2014, Cr 1.1 EGFR 54, microalb 178 ( <17), 5/16 EGFR 44, Cr 1.3, microalb 23, 10/2016: Cr 1.3, egfr 44 (10/16), microalb 10\par        Neuropathy, [facility] 10/2016 due in april , denies tingling, numbness, checks her feet every evening, had a flap x 3 done 3 years ago due to wrong hard brace per pt, none with her new brace \par        microfilament exam pt refused today again "I am fine and it is so hard to remove my prosthesis" , pt has prosthesis, sees Podiatry every 6 months \par        Retinopathy denies: last eye exam done 4/14/2015 Dr Taylor in [place], due to see him\par        Pt having dental work done this month, needs 6 teeth extracted and getting partial dentures. \par        Macrovascular complications: \par        HTN at goal not on meds\par        CAD/CVA denies Dr [facility] last seen 2yrs ago per pt same buiding with Dr Desouza her PCP , had also a Holter Monitor 2010 due to have a physical\par        Lipids at goal not on meds with LFT's normal\par        thyroid US 4/2015 right lower pole 3.5mm focal hypoechoic nodule\par        Thyroid US 10/2016 Right lower pole hypoechoic nodule measuring 3.4 x 3.4 x 1.9 mm unchanged and an adjacent similar sized but new hypoechoic nodule as well. \par        Left upper pole hypoechoic nodule measures 3.2 x 2.1 \par        x 1.1 mm. This is not significantly changed. \par

## 2021-11-29 ENCOUNTER — APPOINTMENT (OUTPATIENT)
Dept: ENDOCRINOLOGY | Facility: CLINIC | Age: 51
End: 2021-11-29
Payer: MEDICARE

## 2021-11-29 PROCEDURE — 95251 CONT GLUC MNTR ANALYSIS I&R: CPT

## 2021-11-29 NOTE — REASON FOR VISIT
[Follow - Up] : a follow-up visit [DM Type 2] : DM Type 2 [FreeTextEntry1] : Patient came in for [email or web address] CGM sensor report download. \par Sensor report download completed, given to  to scan into patient's EMR. \par Report tasked to physician for review as requested.

## 2021-12-01 ENCOUNTER — APPOINTMENT (OUTPATIENT)
Dept: INTERNAL MEDICINE | Facility: CLINIC | Age: 51
End: 2021-12-01

## 2021-12-01 ENCOUNTER — APPOINTMENT (OUTPATIENT)
Dept: FAMILY MEDICINE | Facility: CLINIC | Age: 51
End: 2021-12-01

## 2022-01-18 ENCOUNTER — RX RENEWAL (OUTPATIENT)
Age: 52
End: 2022-01-18

## 2022-01-26 ENCOUNTER — APPOINTMENT (OUTPATIENT)
Dept: INTERNAL MEDICINE | Facility: CLINIC | Age: 52
End: 2022-01-26
Payer: MEDICARE

## 2022-01-26 VITALS — TEMPERATURE: 98 F | BODY MASS INDEX: 33.11 KG/M2 | HEIGHT: 66 IN | WEIGHT: 206 LBS

## 2022-01-26 VITALS — SYSTOLIC BLOOD PRESSURE: 138 MMHG | DIASTOLIC BLOOD PRESSURE: 70 MMHG

## 2022-01-26 DIAGNOSIS — Z12.39 ENCOUNTER FOR OTHER SCREENING FOR MALIGNANT NEOPLASM OF BREAST: ICD-10-CM

## 2022-01-26 DIAGNOSIS — Z00.00 ENCOUNTER FOR GENERAL ADULT MEDICAL EXAMINATION W/OUT ABNORMAL FINDINGS: ICD-10-CM

## 2022-01-26 DIAGNOSIS — Z12.11 ENCOUNTER FOR SCREENING FOR MALIGNANT NEOPLASM OF COLON: ICD-10-CM

## 2022-01-26 DIAGNOSIS — E11.610 TYPE 2 DIABETES MELLITUS WITH DIABETIC NEUROPATHIC ARTHROPATHY: ICD-10-CM

## 2022-01-26 DIAGNOSIS — L97.522 NON-PRESSURE CHRONIC ULCER OF OTHER PART OF LEFT FOOT WITH FAT LAYER EXPOSED: ICD-10-CM

## 2022-01-26 DIAGNOSIS — L97.521 NON-PRESSURE CHRONIC ULCER OF OTHER PART OF LEFT FOOT LIMITED TO BREAKDOWN OF SKIN: ICD-10-CM

## 2022-01-26 PROCEDURE — G0439: CPT

## 2022-01-26 PROCEDURE — 36415 COLL VENOUS BLD VENIPUNCTURE: CPT

## 2022-01-26 RX ORDER — SULFAMETHOXAZOLE AND TRIMETHOPRIM 800; 160 MG/1; MG/1
800-160 TABLET ORAL TWICE DAILY
Qty: 20 | Refills: 0 | Status: COMPLETED | COMMUNITY
Start: 2021-06-14 | End: 2022-01-26

## 2022-01-26 NOTE — PLAN
[FreeTextEntry1] : Due for mammo, colonoscopy, and PAP (rx given for mammo, referral given for colonoscopy, advised patient to contact her GYN for appt for PAP)\par Up to date with vaccinations

## 2022-01-26 NOTE — PHYSICAL EXAM
[Normal Sclera/Conjunctiva] : normal sclera/conjunctiva [EOMI] : extraocular movements intact [Normal Outer Ear/Nose] : the outer ears and nose were normal in appearance [Normal TMs] : both tympanic membranes were normal [No Edema] : there was no peripheral edema [Normal] : affect was normal and insight and judgment were intact

## 2022-01-26 NOTE — HISTORY OF PRESENT ILLNESS
[de-identified] : Patient is a 51F with a hx of DM2, goiter, right foot deformity presents today for annual physical exam.\par Feels well overall recent stomach bug over the weekend with vomiting, feeling better now\par Since I saw Linda last, has had COVID in 2/21 and lost her mom to COVID\par Also had foot ulcer of the left foot (treated by Dr. Barrera\par Recent eye eval 3 months ago (Dr. Umana), may need right eye cataract surgery

## 2022-01-26 NOTE — HEALTH RISK ASSESSMENT
[Never (0 pts)] : Never (0 points) [No] : In the past 12 months have you used drugs other than those required for medical reasons? No [No falls in past year] : Patient reported no falls in the past year [0] : 2) Feeling down, depressed, or hopeless: Not at all (0) [Patient reported mammogram was normal] : Patient reported mammogram was normal [Patient reported PAP Smear was normal] : Patient reported PAP Smear was normal [HIV test declined] : HIV test declined [Hepatitis C test declined] : Hepatitis C test declined [None] : None [With Family] : lives with family [# of Members in Household ___] :  household currently consist of [unfilled] member(s) [On disability] : on disability [College] : College [] :  [# Of Children ___] : has [unfilled] children [Feels Safe at Home] : Feels safe at home [Fully functional (bathing, dressing, toileting, transferring, walking, feeding)] : Fully functional (bathing, dressing, toileting, transferring, walking, feeding) [Fully functional (using the telephone, shopping, preparing meals, housekeeping, doing laundry, using] : Fully functional and needs no help or supervision to perform IADLs (using the telephone, shopping, preparing meals, housekeeping, doing laundry, using transportation, managing medications and managing finances) [Smoke Detector] : smoke detector [Carbon Monoxide Detector] : carbon monoxide detector [Seat Belt] :  uses seat belt [Sunscreen] : uses sunscreen [Never] : Never [PHQ-2 Negative - No further assessment needed] : PHQ-2 Negative - No further assessment needed [Reports normal functional visual acuity (ie: able to read med bottle)] : Reports normal functional visual acuity [Patient/Caregiver not ready to engage] : , patient/caregiver not ready to engage [Audit-CScore] : 0 [de-identified] : walks daily  [de-identified] : normal  [[id number]] : 0 [Change in mental status noted] : No change in mental status noted [Language] : denies difficulty with language [Behavior] : denies difficulty with behavior [Learning/Retaining New Information] : denies difficulty learning/retaining new information [Handling Complex Tasks] : denies difficulty handling complex tasks [Reasoning] : denies difficulty with reasoning [Spatial Ability and Orientation] : denies difficulty with spatial ability and orientation [Reports changes in hearing] : Reports no changes in hearing [Reports changes in vision] : Reports no changes in vision [Reports changes in dental health] : Reports no changes in dental health [Guns at Home] : no guns at home [Safety elements used in home] : no safety elements used in home [Travel to Developing Areas] : does not  travel to developing areas [Caregiver Concerns] : does not have caregiver concerns [MammogramDate] : 11/2018 [PapSmearDate] : 7/2018 [PapSmearComments] : Dr. Mendelowitz [BoneDensityDate] : never [ColonoscopyDate] : never [de-identified] : Year and half [de-identified] : last exam 10/2021 Dr Haskins  [de-identified] : last exam 2020 [AdvancecareDate] : 1/2022

## 2022-01-28 LAB
25(OH)D3 SERPL-MCNC: 33.1 NG/ML
ALBUMIN SERPL ELPH-MCNC: 4.7 G/DL
ALP BLD-CCNC: 44 U/L
ALT SERPL-CCNC: 53 U/L
ANION GAP SERPL CALC-SCNC: 16 MMOL/L
AST SERPL-CCNC: 32 U/L
BILIRUB SERPL-MCNC: 0.4 MG/DL
BUN SERPL-MCNC: 21 MG/DL
CALCIUM SERPL-MCNC: 9.9 MG/DL
CHLORIDE SERPL-SCNC: 100 MMOL/L
CHOLEST SERPL-MCNC: 156 MG/DL
CO2 SERPL-SCNC: 22 MMOL/L
CREAT SERPL-MCNC: 1.03 MG/DL
ESTIMATED AVERAGE GLUCOSE: 151 MG/DL
GLUCOSE SERPL-MCNC: 97 MG/DL
HBA1C MFR BLD HPLC: 6.9 %
HDLC SERPL-MCNC: 36 MG/DL
LDLC SERPL CALC-MCNC: 79 MG/DL
NONHDLC SERPL-MCNC: 120 MG/DL
POTASSIUM SERPL-SCNC: 5 MMOL/L
PROT SERPL-MCNC: 7.5 G/DL
SODIUM SERPL-SCNC: 138 MMOL/L
T4 FREE SERPL-MCNC: 1.4 NG/DL
TRIGL SERPL-MCNC: 205 MG/DL
TSH SERPL-ACNC: 1.17 UIU/ML

## 2022-03-21 ENCOUNTER — APPOINTMENT (OUTPATIENT)
Dept: ENDOCRINOLOGY | Facility: CLINIC | Age: 52
End: 2022-03-21
Payer: MEDICARE

## 2022-03-21 VITALS
HEIGHT: 66 IN | WEIGHT: 210 LBS | BODY MASS INDEX: 33.75 KG/M2 | SYSTOLIC BLOOD PRESSURE: 130 MMHG | DIASTOLIC BLOOD PRESSURE: 70 MMHG | HEART RATE: 77 BPM | OXYGEN SATURATION: 98 %

## 2022-03-21 LAB
ALBUMIN SERPL ELPH-MCNC: 4.3 G/DL
ALP BLD-CCNC: 38 U/L
ALT SERPL-CCNC: 60 U/L
ANION GAP SERPL CALC-SCNC: 12 MMOL/L
AST SERPL-CCNC: 43 U/L
BILIRUB SERPL-MCNC: 0.4 MG/DL
BUN SERPL-MCNC: 26 MG/DL
CALCIUM SERPL-MCNC: 9.4 MG/DL
CHLORIDE SERPL-SCNC: 106 MMOL/L
CHOLEST SERPL-MCNC: 156 MG/DL
CO2 SERPL-SCNC: 19 MMOL/L
CREAT SERPL-MCNC: 1.02 MG/DL
CREAT SPEC-SCNC: 142 MG/DL
EGFR: 67 ML/MIN/1.73M2
ESTIMATED AVERAGE GLUCOSE: 169 MG/DL
GLUCOSE BLDC GLUCOMTR-MCNC: 113
GLUCOSE SERPL-MCNC: 134 MG/DL
HBA1C MFR BLD HPLC: 7.5 %
HDLC SERPL-MCNC: 34 MG/DL
LDLC SERPL CALC-MCNC: 88 MG/DL
MICROALBUMIN 24H UR DL<=1MG/L-MCNC: 4.9 MG/DL
MICROALBUMIN/CREAT 24H UR-RTO: 34 MG/G
NONHDLC SERPL-MCNC: 122 MG/DL
POTASSIUM SERPL-SCNC: 4.8 MMOL/L
PROT SERPL-MCNC: 6.7 G/DL
SODIUM SERPL-SCNC: 138 MMOL/L
TRIGL SERPL-MCNC: 171 MG/DL

## 2022-03-21 PROCEDURE — 99215 OFFICE O/P EST HI 40 MIN: CPT | Mod: 25

## 2022-03-21 PROCEDURE — 95251 CONT GLUC MNTR ANALYSIS I&R: CPT

## 2022-03-21 PROCEDURE — 82962 GLUCOSE BLOOD TEST: CPT

## 2022-03-21 NOTE — REASON FOR VISIT
[Follow - Up] : a follow-up visit [DM Type 2] : DM Type 2 [FreeTextEntry1] : Patient came in for [employer] CGM sensor report download. \par Sensor report download completed, given to  to scan into patient's EMR. \par Report tasked to physician for review as requested.

## 2022-03-21 NOTE — ASSESSMENT
[Long Term Vascular Complications] : long term vascular complications of diabetes [Importance of Diet and Exercise] : importance of diet and exercise to improve glycemic control, achieve weight loss and improve cardiovascular health [Retinopathy Screening] : Patient was referred to ophthalmology for retinopathy screening

## 2022-03-22 NOTE — HISTORY OF PRESENT ILLNESS
[FreeTextEntry1] :   51 yo  Lady coming for f/u DM2, goiter\par Last hemoglobin A1c today 7.5\par        labs reviewed better HgA1c, better LFTs\par        liver US and thyroid US reviewed\par        both her mother and father with DM2 on insulin\par        her mother had recent stroke and toe amputated sees Dr Mcmahan\par        had vomiting and diarrhea for 3 days had a stomach infection, better now , started last Saturday per pt \par        has 3 kids, had GDM with the first 2 pregnancies, \par        type 2 DM for 6 years since 2010 when she was admitted with sepsis + blood cultures, N/V at [facility] complicated by neuropathy s/p multiple surgeries for an infection in her right foot x2 years, resolved 7months ago\par        on\par        Janumet 50-1000mg 1tab bid with no side effects \par        Glipizide XL 5mg before dinner once a day \par        Feeling well, denies hypoglycemia. No side effects from medications. \par        Last A1c 5.7% on 10/2016, prior HgA1c on 10/15 was 5.8%, 4/2015 was 6.3% , 8/2014 was 8.3%, used to be 5.3-7.2%\par        Checks BS 2-3 times per week again no records, per pt :\par        175 highest after going out for dinner, 120 this morning per pt, lowest 98 the other day per pt \par        Microvascular complications: \par        Nephropathy: +1 proteins in the urine 8/2014, Cr 1.1 EGFR 54, microalb 178 ( <17), 5/16 EGFR 44, Cr 1.3, microalb 23, 10/2016: Cr 1.3, egfr 44 (10/16), microalb 10\par        Neuropathy, sees [facility] 10/2016 due in april , denies tingling, numbness, checks her feet every evening, had a flap x 3 done 3 years ago due to wrong hard brace per pt, none with her new brace \par        microfilament exam pt refused today again "I am fine and it is so hard to remove my prosthesis" , pt has prosthesis, sees Podiatry every 6 months \par        Retinopathy denies: last eye exam done 4/14/2015 Dr Taylor in [place], due to see him\par        Pt having dental work done this month, needs 6 teeth extracted and getting partial dentures. \par        Macrovascular complications: \par        HTN at goal not on meds\par        CAD/CVA denies Dr [facility] last seen 2yrs ago per pt same buiding with Dr Desouza her PCP , had also a Holter Monitor 2010 due to have a physical\par        Lipids at goal not on meds with LFT's normal\par        thyroid US 4/2015 right lower pole 3.5mm focal hypoechoic nodule\par        Thyroid US 10/2016 Right lower pole hypoechoic nodule measuring 3.4 x 3.4 x 1.9 mm unchanged and an adjacent similar sized but new hypoechoic nodule as well. \par        Left upper pole hypoechoic nodule measures 3.2 x 2.1 \par        x 1.1 mm. This is not significantly changed. \par

## 2022-04-04 ENCOUNTER — APPOINTMENT (OUTPATIENT)
Dept: ENDOCRINOLOGY | Facility: CLINIC | Age: 52
End: 2022-04-04
Payer: MEDICARE

## 2022-04-04 PROCEDURE — 95251 CONT GLUC MNTR ANALYSIS I&R: CPT

## 2022-04-06 NOTE — HISTORY OF PRESENT ILLNESS
[Continuous Glucose Monitoring] : Continuous Glucose Monitoring: Yes [Raheem] : Raheem [Other:______] : [unfilled] [FreeTextEntry1] :  50 yo  Lady coming for f/u DM2, goiter\par Last hemoglobin A1c today 7.5\par        labs reviewed\par        liver US and thyroid US reviewed\par        both her mother and father with DM2 on insulin\par        her mother had recent stroke and toe amputated sees Dr Mcmahan\par      \par        has 3 kids, had GDM with the first 2 pregnancies, \par        type 2 DM for 6 years since 2010 when she was admitted with sepsis + blood cultures, N/V at [facility] complicated by neuropathy s/p multiple surgeries for an infection in her right foot x2 years, resolved 7months ago\par        on\par        Janumet 50-1000mg 1tab bid with no side effects \par        Glipizide XL 5mg before dinner once a day \par        Feeling well, denies hypoglycemia. No side effects from medications. \par        Last A1c 5.7% on 10/2016, prior HgA1c on 10/15 was 5.8%, 4/2015 was 6.3% , 8/2014 was 8.3%, used to be 5.3-7.2%\par        Checks BS 2-3 times per week again no records, per pt :\par        175 highest after going out for dinner, 120 this morning per pt, lowest 98 the other day per pt \par        Microvascular complications: \par        Nephropathy: +1 proteins in the urine 8/2014, Cr 1.1 EGFR 54, microalb 178 ( <17), 5/16 EGFR 44, Cr 1.3, microalb 23, 10/2016: Cr 1.3, egfr 44 (10/16), microalb 10\par        Neuropathy, sees Dr Majano, [facility] 10/2016 due in april , denies tingling, numbness, checks her feet every evening, had a flap x 3 done 3 years ago due to wrong hard brace per pt, none with her new brace \par        microfilament exam pt refused today again "I am fine and it is so hard to remove my prosthesis" , pt has prosthesis, sees Podiatry every 6 months \par        Retinopathy denies: last eye exam done 4/14/2015 Dr Taylor in [place], due to see him\par        Pt having dental work done this month, needs 6 teeth extracted and getting partial dentures. \par        Macrovascular complications: \par        HTN at goal not on meds\par        CAD/CVA denies Dr [facility] last seen 2yrs ago per pt same buiding with Dr Desouza her PCP , had also a Holter Monitor 2010 due to have a physical\par        Lipids at goal not on meds with LFT's normal\par        thyroid US 4/2015 right lower pole 3.5mm focal hypoechoic nodule\par        Thyroid US 10/2016 Right lower pole hypoechoic nodule measuring 3.4 x 3.4 x 1.9 mm unchanged and an adjacent similar sized but new hypoechoic nodule as well. \par        Left upper pole hypoechoic nodule measures 3.2 x 2.1 \par        x 1.1 mm. This is not significantly changed. \par

## 2022-04-25 ENCOUNTER — RX RENEWAL (OUTPATIENT)
Age: 52
End: 2022-04-25

## 2022-07-05 ENCOUNTER — APPOINTMENT (OUTPATIENT)
Dept: INTERNAL MEDICINE | Facility: CLINIC | Age: 52
End: 2022-07-05

## 2022-07-05 VITALS — SYSTOLIC BLOOD PRESSURE: 122 MMHG | DIASTOLIC BLOOD PRESSURE: 62 MMHG

## 2022-07-05 DIAGNOSIS — Z01.818 ENCOUNTER FOR OTHER PREPROCEDURAL EXAMINATION: ICD-10-CM

## 2022-07-05 DIAGNOSIS — H26.9 UNSPECIFIED CATARACT: ICD-10-CM

## 2022-07-05 PROCEDURE — 93000 ELECTROCARDIOGRAM COMPLETE: CPT

## 2022-07-05 PROCEDURE — 99214 OFFICE O/P EST MOD 30 MIN: CPT | Mod: 25

## 2022-07-05 PROCEDURE — 36415 COLL VENOUS BLD VENIPUNCTURE: CPT

## 2022-07-05 RX ORDER — OFLOXACIN 3 MG/ML
0.3 SOLUTION/ DROPS OPHTHALMIC
Qty: 5 | Refills: 0 | Status: ACTIVE | COMMUNITY
Start: 2022-06-27

## 2022-07-05 NOTE — PHYSICAL EXAM
[Normal Sclera/Conjunctiva] : normal sclera/conjunctiva [EOMI] : extraocular movements intact [Normal Outer Ear/Nose] : the outer ears and nose were normal in appearance [Normal TMs] : both tympanic membranes were normal [No Lymphadenopathy] : no lymphadenopathy [No Edema] : there was no peripheral edema [No Rash] : no rash [Normal] : affect was normal and insight and judgment were intact

## 2022-07-05 NOTE — HISTORY OF PRESENT ILLNESS
[No Pertinent Cardiac History] : no history of aortic stenosis, atrial fibrillation, coronary artery disease, recent myocardial infarction, or implantable device/pacemaker [No Pertinent Pulmonary History] : no history of asthma, COPD, sleep apnea, or smoking [No Adverse Anesthesia Reaction] : no adverse anesthesia reaction in self or family member [Diabetes] : diabetes [(Patient denies any chest pain, claudication, dyspnea on exertion, orthopnea, palpitations or syncope)] : Patient denies any chest pain, claudication, dyspnea on exertion, orthopnea, palpitations or syncope [Moderate (4-6 METs)] : Moderate (4-6 METs) [Chronic Anticoagulation] : no chronic anticoagulation [Chronic Kidney Disease] : no chronic kidney disease [FreeTextEntry1] : Right cataract surgery [FreeTextEntry2] : 7/12/2022 [FreeTextEntry3] : Dr. Larry Umana  [FreeTextEntry4] : Patient is a 51F with a hx of DM2, goiter, right foot deformity presents today for pre-op evaluation for cataract surgery\par Had left eys done in 2019

## 2022-07-07 LAB
ALBUMIN SERPL ELPH-MCNC: 4.4 G/DL
ALP BLD-CCNC: 41 U/L
ALT SERPL-CCNC: 39 U/L
ANION GAP SERPL CALC-SCNC: 13 MMOL/L
AST SERPL-CCNC: 29 U/L
BASOPHILS # BLD AUTO: 0.05 K/UL
BASOPHILS NFR BLD AUTO: 0.5 %
BILIRUB SERPL-MCNC: 0.2 MG/DL
BUN SERPL-MCNC: 23 MG/DL
CALCIUM SERPL-MCNC: 10.2 MG/DL
CHLORIDE SERPL-SCNC: 105 MMOL/L
CO2 SERPL-SCNC: 22 MMOL/L
CREAT SERPL-MCNC: 1.22 MG/DL
EGFR: 54 ML/MIN/1.73M2
EOSINOPHIL # BLD AUTO: 0.28 K/UL
EOSINOPHIL NFR BLD AUTO: 2.9 %
GLUCOSE SERPL-MCNC: 99 MG/DL
HCT VFR BLD CALC: 42 %
HGB BLD-MCNC: 12.7 G/DL
IMM GRANULOCYTES NFR BLD AUTO: 0.3 %
LYMPHOCYTES # BLD AUTO: 2.87 K/UL
LYMPHOCYTES NFR BLD AUTO: 30.2 %
MAN DIFF?: NORMAL
MCHC RBC-ENTMCNC: 30.2 GM/DL
MCHC RBC-ENTMCNC: 30.6 PG
MCV RBC AUTO: 101.2 FL
MONOCYTES # BLD AUTO: 0.73 K/UL
MONOCYTES NFR BLD AUTO: 7.7 %
NEUTROPHILS # BLD AUTO: 5.54 K/UL
NEUTROPHILS NFR BLD AUTO: 58.4 %
PLATELET # BLD AUTO: 285 K/UL
POTASSIUM SERPL-SCNC: 5.5 MMOL/L
PROT SERPL-MCNC: 7 G/DL
RBC # BLD: 4.15 M/UL
RBC # FLD: 13.9 %
SODIUM SERPL-SCNC: 141 MMOL/L
WBC # FLD AUTO: 9.5 K/UL

## 2022-07-12 ENCOUNTER — HOSPITAL ENCOUNTER (OUTPATIENT)
Dept: HOSPITAL 74 - FASU | Age: 52
Discharge: HOME | End: 2022-07-12
Attending: OPHTHALMOLOGY
Payer: COMMERCIAL

## 2022-07-12 VITALS — DIASTOLIC BLOOD PRESSURE: 80 MMHG | SYSTOLIC BLOOD PRESSURE: 123 MMHG | HEART RATE: 85 BPM

## 2022-07-12 VITALS — TEMPERATURE: 97.8 F

## 2022-07-12 VITALS — BODY MASS INDEX: 34 KG/M2

## 2022-07-12 DIAGNOSIS — H25.9: Primary | ICD-10-CM

## 2022-07-12 PROCEDURE — 08RJ3JZ REPLACEMENT OF RIGHT LENS WITH SYNTHETIC SUBSTITUTE, PERCUTANEOUS APPROACH: ICD-10-PCS | Performed by: OPHTHALMOLOGY

## 2022-07-12 PROCEDURE — 66984 XCAPSL CTRC RMVL W/O ECP: CPT

## 2022-07-12 RX ADMIN — OFLOXACIN SCH DROP: 3 SOLUTION/ DROPS OPHTHALMIC at 11:35

## 2022-07-12 RX ADMIN — CYCLOPENTOLATE HYDROCHLORIDE SCH DROP: 10 SOLUTION/ DROPS OPHTHALMIC at 11:40

## 2022-07-12 RX ADMIN — KETOROLAC TROMETHAMINE SCH DROP: 5 SOLUTION OPHTHALMIC at 11:40

## 2022-07-12 RX ADMIN — TROPICAMIDE SCH DROP: 10 SOLUTION/ DROPS OPHTHALMIC at 11:40

## 2022-07-12 RX ADMIN — KETOROLAC TROMETHAMINE SCH DROP: 5 SOLUTION OPHTHALMIC at 11:35

## 2022-07-12 RX ADMIN — OFLOXACIN SCH DROP: 3 SOLUTION/ DROPS OPHTHALMIC at 11:40

## 2022-07-12 RX ADMIN — KETOROLAC TROMETHAMINE SCH DROP: 5 SOLUTION OPHTHALMIC at 11:30

## 2022-07-12 RX ADMIN — PHENYLEPHRINE HYDROCHLORIDE SCH DROP: 0.25 SPRAY NASAL at 11:35

## 2022-07-12 RX ADMIN — PHENYLEPHRINE HYDROCHLORIDE SCH DROP: 0.25 SPRAY NASAL at 11:40

## 2022-07-12 RX ADMIN — CYCLOPENTOLATE HYDROCHLORIDE SCH DROP: 10 SOLUTION/ DROPS OPHTHALMIC at 11:30

## 2022-07-12 RX ADMIN — TROPICAMIDE SCH DROP: 10 SOLUTION/ DROPS OPHTHALMIC at 11:35

## 2022-07-12 RX ADMIN — PHENYLEPHRINE HYDROCHLORIDE SCH DROP: 0.25 SPRAY NASAL at 11:30

## 2022-07-12 RX ADMIN — TROPICAMIDE SCH DROP: 10 SOLUTION/ DROPS OPHTHALMIC at 11:30

## 2022-07-12 RX ADMIN — CYCLOPENTOLATE HYDROCHLORIDE SCH DROP: 10 SOLUTION/ DROPS OPHTHALMIC at 11:35

## 2022-07-12 RX ADMIN — OFLOXACIN SCH DROP: 3 SOLUTION/ DROPS OPHTHALMIC at 11:30

## 2022-08-02 ENCOUNTER — APPOINTMENT (OUTPATIENT)
Dept: ENDOCRINOLOGY | Facility: CLINIC | Age: 52
End: 2022-08-02

## 2022-08-02 VITALS
DIASTOLIC BLOOD PRESSURE: 62 MMHG | WEIGHT: 210 LBS | OXYGEN SATURATION: 98 % | SYSTOLIC BLOOD PRESSURE: 110 MMHG | BODY MASS INDEX: 33.75 KG/M2 | HEART RATE: 79 BPM | HEIGHT: 66 IN

## 2022-08-02 LAB
25(OH)D3 SERPL-MCNC: 38.8 NG/ML
ALBUMIN SERPL ELPH-MCNC: 4.3 G/DL
ALP BLD-CCNC: 40 U/L
ALT SERPL-CCNC: 45 U/L
ANION GAP SERPL CALC-SCNC: 9 MMOL/L
AST SERPL-CCNC: 31 U/L
BILIRUB SERPL-MCNC: 0.4 MG/DL
BUN SERPL-MCNC: 24 MG/DL
CALCIUM SERPL-MCNC: 9.5 MG/DL
CHLORIDE SERPL-SCNC: 106 MMOL/L
CHOLEST SERPL-MCNC: 172 MG/DL
CO2 SERPL-SCNC: 25 MMOL/L
CREAT SERPL-MCNC: 1.06 MG/DL
CREAT SPEC-SCNC: 117 MG/DL
EGFR: 64 ML/MIN/1.73M2
ESTIMATED AVERAGE GLUCOSE: 148 MG/DL
GLUCOSE BLDC GLUCOMTR-MCNC: 116
GLUCOSE SERPL-MCNC: 117 MG/DL
HBA1C MFR BLD HPLC: 6.8 %
HDLC SERPL-MCNC: 40 MG/DL
LDLC SERPL CALC-MCNC: 97 MG/DL
MICROALBUMIN 24H UR DL<=1MG/L-MCNC: 1.7 MG/DL
MICROALBUMIN/CREAT 24H UR-RTO: 15 MG/G
NONHDLC SERPL-MCNC: 133 MG/DL
POTASSIUM SERPL-SCNC: 5.2 MMOL/L
PROT SERPL-MCNC: 6.8 G/DL
SODIUM SERPL-SCNC: 139 MMOL/L
T4 FREE SERPL-MCNC: 1.2 NG/DL
TRIGL SERPL-MCNC: 181 MG/DL
TSH SERPL-ACNC: 1.01 UIU/ML

## 2022-08-02 PROCEDURE — 99215 OFFICE O/P EST HI 40 MIN: CPT

## 2022-08-02 PROCEDURE — 82962 GLUCOSE BLOOD TEST: CPT

## 2022-08-02 PROCEDURE — G0447 BEHAVIOR COUNSEL OBESITY 15M: CPT | Mod: 59

## 2022-09-22 NOTE — HISTORY OF PRESENT ILLNESS
[FreeTextEntry1] :  50 yo  Lady coming for f/u DM2, goiter\par Last hemoglobin A1c today 6.8\par        labs reviewed\par        liver US and thyroid US reviewed\par        both her mother and father with DM2 on insulin\par        her mother had recent stroke and toe amputated sees Dr Mcmahan\par      \par        has 3 kids, had GDM with the first 2 pregnancies, \par        type 2 DM for 6 years since 2010 when she was admitted with sepsis + blood cultures, N/V at [facility] complicated by neuropathy s/p multiple surgeries for an infection in her right foot x2 years, resolved 7months ago\par        on\par        Janumet 50-1000mg 1tab bid with no side effects \par        Glipizide XL 5mg before dinner once a day \par        Feeling well, denies hypoglycemia. No side effects from medications. \par        Last A1c 5.7% on 10/2016, prior HgA1c on 10/15 was 5.8%, 4/2015 was 6.3% , 8/2014 was 8.3%, used to be 5.3-7.2%\par        Checks BS 2-3 times per week again no records, per pt :\par        175 highest after going out for dinner, 120 this morning per pt, lowest 98 the other day per pt \par        Microvascular complications: \par        Nephropathy: +1 proteins in the urine 8/2014, Cr 1.1 EGFR 54, microalb 178 ( <17), 5/16 EGFR 44, Cr 1.3, microalb 23, 10/2016: Cr 1.3, egfr 44 (10/16), microalb 10\par        Neuropathy, sees Dr Majano, [facility] 10/2016 due in april , denies tingling, numbness, checks her feet every evening, had a flap x 3 done 3 years ago due to wrong hard brace per pt, none with her new brace \par        microfilament exam pt refused today again "I am fine and it is so hard to remove my prosthesis" , pt has prosthesis, sees Podiatry every 6 months \par        Retinopathy denies: last eye exam done 4/14/2015 Dr Taylor in [place], due to see him\par        Pt having dental work done this month, needs 6 teeth extracted and getting partial dentures. \par        Macrovascular complications: \par        HTN at goal not on meds\par        CAD/CVA denies Dr [facility] last seen 2yrs ago per pt same buiding with Dr Desouza her PCP , had also a Holter Monitor 2010 due to have a physical\par        Lipids at goal not on meds with LFT's normal\par        thyroid US 4/2015 right lower pole 3.5mm focal hypoechoic nodule\par        Thyroid US 10/2016 Right lower pole hypoechoic nodule measuring 3.4 x 3.4 x 1.9 mm unchanged and an adjacent similar sized but new hypoechoic nodule as well. \par        Left upper pole hypoechoic nodule measures 3.2 x 2.1 \par        x 1.1 mm. This is not significantly changed. \par

## 2023-02-06 ENCOUNTER — APPOINTMENT (OUTPATIENT)
Dept: PODIATRY | Facility: CLINIC | Age: 53
End: 2023-02-06
Payer: MEDICARE

## 2023-02-06 VITALS — BODY MASS INDEX: 34.23 KG/M2 | HEIGHT: 66 IN | WEIGHT: 213 LBS

## 2023-02-06 DIAGNOSIS — M14.672 CHARCOT'S JOINT, LEFT ANKLE AND FOOT: ICD-10-CM

## 2023-02-06 PROCEDURE — 99213 OFFICE O/P EST LOW 20 MIN: CPT

## 2023-02-06 RX ORDER — AMMONIUM LACTATE 12 %
12 CREAM (GRAM) TOPICAL TWICE DAILY
Qty: 1 | Refills: 5 | Status: ACTIVE | COMMUNITY
Start: 2023-02-06 | End: 1900-01-01

## 2023-02-28 ENCOUNTER — APPOINTMENT (OUTPATIENT)
Dept: ENDOCRINOLOGY | Facility: CLINIC | Age: 53
End: 2023-02-28
Payer: MEDICARE

## 2023-02-28 VITALS — HEIGHT: 66.5 IN | WEIGHT: 207 LBS | BODY MASS INDEX: 32.87 KG/M2

## 2023-02-28 LAB
25(OH)D3 SERPL-MCNC: 41.5 NG/ML
ALBUMIN SERPL ELPH-MCNC: 4.5 G/DL
ALP BLD-CCNC: 41 U/L
ALT SERPL-CCNC: 38 U/L
ANION GAP SERPL CALC-SCNC: 12 MMOL/L
AST SERPL-CCNC: 27 U/L
BILIRUB SERPL-MCNC: 0.4 MG/DL
BUN SERPL-MCNC: 28 MG/DL
CALCIUM SERPL-MCNC: 10 MG/DL
CHLORIDE SERPL-SCNC: 105 MMOL/L
CHOLEST SERPL-MCNC: 158 MG/DL
CO2 SERPL-SCNC: 22 MMOL/L
CREAT SERPL-MCNC: 1.16 MG/DL
CREAT SPEC-SCNC: 154 MG/DL
EGFR: 57 ML/MIN/1.73M2
ESTIMATED AVERAGE GLUCOSE: 146 MG/DL
FOLATE SERPL-MCNC: >20 NG/ML
GLUCOSE SERPL-MCNC: 110 MG/DL
HBA1C MFR BLD HPLC: 6.7 %
HDLC SERPL-MCNC: 36 MG/DL
LDLC SERPL CALC-MCNC: 88 MG/DL
MICROALBUMIN 24H UR DL<=1MG/L-MCNC: 1.8 MG/DL
MICROALBUMIN/CREAT 24H UR-RTO: 12 MG/G
NONHDLC SERPL-MCNC: 122 MG/DL
POTASSIUM SERPL-SCNC: 4.9 MMOL/L
PROT SERPL-MCNC: 6.9 G/DL
SODIUM SERPL-SCNC: 140 MMOL/L
TRIGL SERPL-MCNC: 172 MG/DL
VIT B12 SERPL-MCNC: 1190 PG/ML

## 2023-02-28 PROCEDURE — G0447 BEHAVIOR COUNSEL OBESITY 15M: CPT | Mod: 59,95

## 2023-02-28 PROCEDURE — 99215 OFFICE O/P EST HI 40 MIN: CPT | Mod: 25,95

## 2023-02-28 RX ORDER — LANCETS 33 GAUGE
EACH MISCELLANEOUS
Qty: 100 | Refills: 1 | Status: ACTIVE | COMMUNITY
Start: 2019-09-11 | End: 1900-01-01

## 2023-02-28 RX ORDER — BLOOD-GLUCOSE METER
W/DEVICE EACH MISCELLANEOUS
Qty: 1 | Refills: 0 | Status: ACTIVE | COMMUNITY
Start: 2023-02-28 | End: 1900-01-01

## 2023-02-28 NOTE — HISTORY OF PRESENT ILLNESS
[Home] : at home, [unfilled] , at the time of the visit. [Other Location: e.g. Home (Enter Location, City,State)___] : at [unfilled] [Verbal consent obtained from patient] : the patient, [unfilled] [FreeTextEntry1] :  50 yo  Lady coming for f/u DM2, goiter\par Last hemoglobin A1c today 6.8\par        labs reviewed\par        liver US and thyroid US reviewed\par        both her mother and father with DM2 on insulin\par        her mother had recent stroke and toe amputated sees Dr Mcmahan\par      \par        has 3 kids, had GDM with the first 2 pregnancies, \par        type 2 DM for 6 years since 2010 when she was admitted with sepsis + blood cultures, N/V at [facility] complicated by neuropathy s/p multiple surgeries for an infection in her right foot x2 years, resolved 7months ago\par        on\par        Janumet 50-1000mg 1tab bid with no side effects \par        Glipizide XL 5mg before dinner once a day \par        Feeling well, denies hypoglycemia. No side effects from medications. \par        Last A1c 5.7% on 10/2016, prior HgA1c on 10/15 was 5.8%, 4/2015 was 6.3% , 8/2014 was 8.3%, used to be 5.3-7.2%\par        Checks BS 2-3 times per week again no records, per pt :\par        175 highest after going out for dinner, 120 this morning per pt, lowest 98 the other day per pt \par        Microvascular complications: \par        Nephropathy: +1 proteins in the urine 8/2014, Cr 1.1 EGFR 54, microalb 178 ( <17), 5/16 EGFR 44, Cr 1.3, microalb 23, 10/2016: Cr 1.3, egfr 44 (10/16), microalb 10\par        Neuropathy, sees Dr Majano[facility] 10/2016 due in april , denies tingling, numbness, checks her feet every evening, had a flap x 3 done 3 years ago due to wrong hard brace per pt, none with her new brace \par        microfilament exam pt refused today again "I am fine and it is so hard to remove my prosthesis" , pt has prosthesis, sees Podiatry every 6 months \par        Retinopathy denies: last eye exam done 4/14/2015 Dr Taylor in [place], due to see him\par        Pt having dental work done this month, needs 6 teeth extracted and getting partial dentures. \par        Macrovascular complications: \par        HTN at goal not on meds\par        CAD/CVA denies Dr [facility] last seen 2yrs ago per pt same buiding with Dr Desouza her PCP , had also a Holter Monitor 2010 due to have a physical\par        Lipids at goal not on meds with LFT's normal\par        thyroid US 4/2015 right lower pole 3.5mm focal hypoechoic nodule\par        Thyroid US 10/2016 Right lower pole hypoechoic nodule measuring 3.4 x 3.4 x 1.9 mm unchanged and an adjacent similar sized but new hypoechoic nodule as well. \par        Left upper pole hypoechoic nodule measures 3.2 x 2.1 \par        x 1.1 mm. This is not significantly changed. \par

## 2023-03-31 ENCOUNTER — RX RENEWAL (OUTPATIENT)
Age: 53
End: 2023-03-31

## 2023-04-17 ENCOUNTER — RX RENEWAL (OUTPATIENT)
Age: 53
End: 2023-04-17

## 2023-04-17 RX ORDER — TIRZEPATIDE 2.5 MG/.5ML
2.5 INJECTION, SOLUTION SUBCUTANEOUS
Qty: 1 | Refills: 0 | Status: DISCONTINUED | COMMUNITY
Start: 2023-02-28 | End: 2023-04-17

## 2023-04-17 RX ORDER — MUPIROCIN 20 MG/G
2 OINTMENT TOPICAL 3 TIMES DAILY
Qty: 22 | Refills: 3 | Status: ACTIVE | COMMUNITY
Start: 2021-06-14 | End: 1900-01-01

## 2023-05-31 ENCOUNTER — RX RENEWAL (OUTPATIENT)
Age: 53
End: 2023-05-31

## 2023-06-01 ENCOUNTER — APPOINTMENT (OUTPATIENT)
Dept: ENDOCRINOLOGY | Facility: CLINIC | Age: 53
End: 2023-06-01
Payer: MEDICARE

## 2023-06-01 VITALS
BODY MASS INDEX: 32.08 KG/M2 | DIASTOLIC BLOOD PRESSURE: 70 MMHG | HEIGHT: 66.5 IN | OXYGEN SATURATION: 98 % | SYSTOLIC BLOOD PRESSURE: 118 MMHG | WEIGHT: 202 LBS | HEART RATE: 86 BPM

## 2023-06-01 LAB
ALBUMIN SERPL ELPH-MCNC: 4.7 G/DL
ALP BLD-CCNC: 42 U/L
ALT SERPL-CCNC: 21 U/L
ANION GAP SERPL CALC-SCNC: 12 MMOL/L
AST SERPL-CCNC: 22 U/L
BILIRUB SERPL-MCNC: 0.6 MG/DL
BUN SERPL-MCNC: 36 MG/DL
CALCIUM SERPL-MCNC: 10.1 MG/DL
CHLORIDE SERPL-SCNC: 100 MMOL/L
CHOLEST SERPL-MCNC: 84 MG/DL
CO2 SERPL-SCNC: 24 MMOL/L
CREAT SERPL-MCNC: 1.2 MG/DL
CREAT SPEC-SCNC: 157 MG/DL
EGFR: 55 ML/MIN/1.73M2
ESTIMATED AVERAGE GLUCOSE: 128 MG/DL
GLUCOSE BLDC GLUCOMTR-MCNC: 104
GLUCOSE SERPL-MCNC: 98 MG/DL
HBA1C MFR BLD HPLC: 6.1 %
HDLC SERPL-MCNC: 35 MG/DL
LDLC SERPL CALC-MCNC: 13 MG/DL
MICROALBUMIN 24H UR DL<=1MG/L-MCNC: 1.3 MG/DL
MICROALBUMIN/CREAT 24H UR-RTO: 9 MG/G
NONHDLC SERPL-MCNC: 49 MG/DL
POTASSIUM SERPL-SCNC: 4.6 MMOL/L
PROT SERPL-MCNC: 7.5 G/DL
SODIUM SERPL-SCNC: 137 MMOL/L
T4 FREE SERPL-MCNC: 1.4 NG/DL
TRIGL SERPL-MCNC: 179 MG/DL
TSH SERPL-ACNC: 1.67 UIU/ML

## 2023-06-01 PROCEDURE — G0447 BEHAVIOR COUNSEL OBESITY 15M: CPT | Mod: 59

## 2023-06-01 PROCEDURE — 99215 OFFICE O/P EST HI 40 MIN: CPT | Mod: 25

## 2023-06-01 PROCEDURE — G0444 DEPRESSION SCREEN ANNUAL: CPT | Mod: 59

## 2023-06-01 PROCEDURE — 82962 GLUCOSE BLOOD TEST: CPT

## 2023-06-01 RX ORDER — BLOOD SUGAR DIAGNOSTIC
STRIP MISCELLANEOUS DAILY
Qty: 100 | Refills: 2 | Status: ACTIVE | COMMUNITY
Start: 2021-11-08 | End: 1900-01-01

## 2023-06-01 NOTE — HISTORY OF PRESENT ILLNESS
[Home] : at home, [unfilled] , at the time of the visit. [Other Location: e.g. Home (Enter Location, City,State)___] : at [unfilled] [Verbal consent obtained from patient] : the patient, [unfilled] [FreeTextEntry1] :  53 yo  Lady coming for f/u DM2, goiter\par Last hemoglobin A1c today 6.1 down from 6.7 after starting Mounjaro\par        labs reviewed\par        liver US and thyroid US reviewed\par        both her mother and father with DM2 on insulin\par        her mother had recent stroke and toe amputated sees Dr Mcmahan\par      \par        has 3 kids, had GDM with the first 2 pregnancies, \par        type 2 DM for 6 years since 2010 when she was admitted with sepsis + blood cultures, N/V at [facility] complicated by neuropathy s/p multiple surgeries for an infection in her right foot x2 years, resolved 7months ago\par        on\par        Janumet 50-1000mg 1tab bid with no side effects \par        Glipizide XL 5mg before dinner once a day \par        Feeling well, denies hypoglycemia. No side effects from medications. \par        Last A1c 5.7% on 10/2016, prior HgA1c on 10/15 was 5.8%, 4/2015 was 6.3% , 8/2014 was 8.3%, used to be 5.3-7.2%\par        Checks BS 2-3 times per week again no records, per pt :\par        175 highest after going out for dinner, 120 this morning per pt, lowest 98 the other day per pt \par        Microvascular complications: \par        Nephropathy: +1 proteins in the urine 8/2014, Cr 1.1 EGFR 54, microalb 178 ( <17), 5/16 EGFR 44, Cr 1.3, microalb 23, 10/2016: Cr 1.3, egfr 44 (10/16), microalb 10\par        Neuropathy, sees [facility] 10/2016 due in april , denies tingling, numbness, checks her feet every evening, had a flap x 3 done 3 years ago due to wrong hard brace per pt, none with her new brace \par        microfilament exam pt refused today again "I am fine and it is so hard to remove my prosthesis" , pt has prosthesis, sees Podiatry every 6 months \par        Retinopathy denies: last eye exam done 4/14/2015 Dr Taylor in [place], due to see him\par        Pt having dental work done this month, needs 6 teeth extracted and getting partial dentures. \par        Macrovascular complications: \par        HTN at goal not on meds\par        CAD/CVA denies Dr [facility] last seen 2yrs ago per pt same buiding with Dr Desouza her PCP , had also a Holter Monitor 2010 due to have a physical\par        Lipids at goal not on meds with LFT's normal\par        thyroid US 4/2015 right lower pole 3.5mm focal hypoechoic nodule\par        Thyroid US 10/2016 Right lower pole hypoechoic nodule measuring 3.4 x 3.4 x 1.9 mm unchanged and an adjacent similar sized but new hypoechoic nodule as well. \par        Left upper pole hypoechoic nodule measures 3.2 x 2.1 \par        x 1.1 mm. This is not significantly changed. \par

## 2023-06-01 NOTE — ASSESSMENT
[0] : 2) Feeling down, depressed, or hopeless: Not at all (0) [PHQ-2 Negative - No further assessment needed] : PHQ-2 Negative - No further assessment needed [[id number]] : 0

## 2023-06-07 ENCOUNTER — NON-APPOINTMENT (OUTPATIENT)
Age: 53
End: 2023-06-07

## 2023-06-08 NOTE — REVIEW OF SYSTEMS
[As Noted in HPI] : as noted in HPI [Joint Swelling] : joint swelling [Joint Stiffness] : joint stiffness [Limb Weakness] : limb weakness [Difficulty Walking] : difficulty walking [Negative] : Integumentary [Fever] : no fever [Chills] : no chills [Leg Claudication] : no intermittent leg claudication [Lower Ext Edema] : no lower extremity edema [Skin Wound] : no skin wound [Skin Lesions] : no skin lesions [Itching] : no itching

## 2023-06-08 NOTE — REASON FOR VISIT
[Follow-Up Visit] : a follow-up visit for [FreeTextEntry2] : diabetes and multiple ,manifestations 2/2 DM

## 2023-06-08 NOTE — ADDENDUM
[FreeTextEntry1] : The patient requires a new metal custom made AFO with double action ankle joints attached to custom molded shoe. DUE TO HER CHARCOT ARTHROPATHY, THE OLD BRACE THAT SHE IS CURRENTLY WEARING IS NO LONGER FUNCTIONAL.

## 2023-06-08 NOTE — HISTORY OF PRESENT ILLNESS
[FreeTextEntry1] : The above-named patient was referred here as a new patient for evaluation and management of diabetes as well as a complete podiatric evaluation regarding diabetes and their lower extremity. An overall podiatric evaluation and management is requested\par She has had bilateral charcot repair, hx of OM and necessitates braces and special shoes w/ midfoot accommodations\par She requests a new brace\par

## 2023-06-08 NOTE — PHYSICAL EXAM
[General Appearance - Alert] : alert [General Appearance - In No Acute Distress] : in no acute distress [Right Foot Was Examined] : The right foot was examined [Left Foot Was Examined] : The left foot was examined [Delayed in the Right Toes] : delayed in the toes [Delayed in the Left Toes] : delayed in the toes [1+] : 1+ in the dorsalis pedis [Vibration Dec.] : diminished vibratory sensation at the level of the toes [Position Sense Dec.] : diminished position sense at the level of the toes [Diminished Throughout Right Foot] : diminished tactile sensation with monofilament testing throughout right foot [Diminished Throughout Left Foot] : diminished tactile sensation with monofilament testing throughout left foot [de-identified] : NWB LLE, brace being worn on the right. charcot foot type bilateral [Normal Appearance] : not normal in appearance [Normal in Appearance] : not normal in appearance [Full ROM] : with limited range of motion [FreeTextEntry1] : There is cracking and peeling noted to the plantar aspect of both feet specifically the proximal areas around the heel medially laterally and plantarly\par

## 2023-07-21 ENCOUNTER — RX RENEWAL (OUTPATIENT)
Age: 53
End: 2023-07-21

## 2023-08-31 ENCOUNTER — RX RENEWAL (OUTPATIENT)
Age: 53
End: 2023-08-31

## 2023-09-05 ENCOUNTER — RX RENEWAL (OUTPATIENT)
Age: 53
End: 2023-09-05

## 2023-09-06 RX ORDER — BLOOD SUGAR DIAGNOSTIC
STRIP MISCELLANEOUS DAILY
Qty: 100 | Refills: 3 | Status: ACTIVE | COMMUNITY
Start: 2019-09-18 | End: 1900-01-01

## 2023-10-23 ENCOUNTER — RESULT REVIEW (OUTPATIENT)
Age: 53
End: 2023-10-23

## 2023-10-24 ENCOUNTER — APPOINTMENT (OUTPATIENT)
Dept: ENDOCRINOLOGY | Facility: CLINIC | Age: 53
End: 2023-10-24
Payer: MEDICARE

## 2023-10-24 VITALS
HEART RATE: 61 BPM | BODY MASS INDEX: 27.79 KG/M2 | WEIGHT: 175 LBS | SYSTOLIC BLOOD PRESSURE: 114 MMHG | HEIGHT: 66.5 IN | OXYGEN SATURATION: 98 % | DIASTOLIC BLOOD PRESSURE: 72 MMHG

## 2023-10-24 LAB
ALBUMIN SERPL ELPH-MCNC: 4.8 G/DL
ALP BLD-CCNC: 45 U/L
ALT SERPL-CCNC: 26 U/L
ANION GAP SERPL CALC-SCNC: 11 MMOL/L
AST SERPL-CCNC: 23 U/L
BILIRUB SERPL-MCNC: 0.6 MG/DL
BUN SERPL-MCNC: 32 MG/DL
CALCIUM SERPL-MCNC: 9.8 MG/DL
CHLORIDE SERPL-SCNC: 101 MMOL/L
CHOLEST SERPL-MCNC: 78 MG/DL
CO2 SERPL-SCNC: 23 MMOL/L
CREAT SERPL-MCNC: 1.02 MG/DL
EGFR: 67 ML/MIN/1.73M2
ESTIMATED AVERAGE GLUCOSE: 105 MG/DL
GLUCOSE BLDC GLUCOMTR-MCNC: 76
GLUCOSE SERPL-MCNC: 79 MG/DL
HBA1C MFR BLD HPLC: 5.3 %
HDLC SERPL-MCNC: 37 MG/DL
LDLC SERPL CALC-MCNC: 21 MG/DL
NONHDLC SERPL-MCNC: 41 MG/DL
POTASSIUM SERPL-SCNC: 4.9 MMOL/L
PROT SERPL-MCNC: 7.2 G/DL
SODIUM SERPL-SCNC: 136 MMOL/L
TRIGL SERPL-MCNC: 108 MG/DL

## 2023-10-24 PROCEDURE — 99215 OFFICE O/P EST HI 40 MIN: CPT | Mod: 25

## 2023-10-24 PROCEDURE — 82962 GLUCOSE BLOOD TEST: CPT

## 2024-01-08 ENCOUNTER — APPOINTMENT (OUTPATIENT)
Dept: ENDOCRINOLOGY | Facility: CLINIC | Age: 54
End: 2024-01-08

## 2024-01-18 ENCOUNTER — RX RENEWAL (OUTPATIENT)
Age: 54
End: 2024-01-18

## 2024-01-23 ENCOUNTER — RX RENEWAL (OUTPATIENT)
Age: 54
End: 2024-01-23

## 2024-01-23 RX ORDER — GLIPIZIDE 2.5 MG/1
2.5 TABLET, FILM COATED, EXTENDED RELEASE ORAL
Qty: 180 | Refills: 1 | Status: DISCONTINUED | COMMUNITY
Start: 2020-06-29 | End: 2024-01-23

## 2024-01-23 RX ORDER — SITAGLIPTIN AND METFORMIN HYDROCHLORIDE 50; 1000 MG/1; MG/1
50-1000 TABLET, FILM COATED ORAL
Qty: 180 | Refills: 1 | Status: ACTIVE | COMMUNITY
Start: 2020-06-29 | End: 1900-01-01

## 2024-03-02 ENCOUNTER — RX RENEWAL (OUTPATIENT)
Age: 54
End: 2024-03-02

## 2024-03-02 RX ORDER — ATORVASTATIN CALCIUM 10 MG/1
10 TABLET, FILM COATED ORAL
Qty: 90 | Refills: 1 | Status: ACTIVE | COMMUNITY
Start: 2023-02-28 | End: 1900-01-01

## 2024-04-30 ENCOUNTER — APPOINTMENT (OUTPATIENT)
Dept: ENDOCRINOLOGY | Facility: CLINIC | Age: 54
End: 2024-04-30
Payer: MEDICARE

## 2024-04-30 VITALS
OXYGEN SATURATION: 98 % | DIASTOLIC BLOOD PRESSURE: 70 MMHG | BODY MASS INDEX: 25.73 KG/M2 | WEIGHT: 162 LBS | SYSTOLIC BLOOD PRESSURE: 118 MMHG | HEART RATE: 83 BPM | HEIGHT: 66.5 IN

## 2024-04-30 DIAGNOSIS — E55.9 VITAMIN D DEFICIENCY, UNSPECIFIED: ICD-10-CM

## 2024-04-30 DIAGNOSIS — M14.60 CHARCOT'S JOINT, UNSPECIFIED SITE: ICD-10-CM

## 2024-04-30 DIAGNOSIS — E11.42 TYPE 2 DIABETES MELLITUS WITH DIABETIC POLYNEUROPATHY: ICD-10-CM

## 2024-04-30 DIAGNOSIS — R80.9 PROTEINURIA, UNSPECIFIED: ICD-10-CM

## 2024-04-30 DIAGNOSIS — E66.3 OVERWEIGHT: ICD-10-CM

## 2024-04-30 DIAGNOSIS — I10 ESSENTIAL (PRIMARY) HYPERTENSION: ICD-10-CM

## 2024-04-30 DIAGNOSIS — E78.5 HYPERLIPIDEMIA, UNSPECIFIED: ICD-10-CM

## 2024-04-30 DIAGNOSIS — E04.9 NONTOXIC GOITER, UNSPECIFIED: ICD-10-CM

## 2024-04-30 LAB
ALBUMIN SERPL ELPH-MCNC: 4.6 G/DL
ALP BLD-CCNC: 51 U/L
ALT SERPL-CCNC: 21 U/L
ANION GAP SERPL CALC-SCNC: 12 MMOL/L
AST SERPL-CCNC: 15 U/L
BILIRUB SERPL-MCNC: 0.4 MG/DL
BUN SERPL-MCNC: 36 MG/DL
CALCIUM SERPL-MCNC: 9.5 MG/DL
CHLORIDE SERPL-SCNC: 103 MMOL/L
CHOLEST SERPL-MCNC: 92 MG/DL
CO2 SERPL-SCNC: 23 MMOL/L
CREAT SERPL-MCNC: 1.09 MG/DL
CREAT SPEC-SCNC: 92 MG/DL
EGFR: 61 ML/MIN/1.73M2
ESTIMATED AVERAGE GLUCOSE: 131 MG/DL
GLUCOSE BLDC GLUCOMTR-MCNC: 112
GLUCOSE SERPL-MCNC: 110 MG/DL
HBA1C MFR BLD HPLC: 6.2 %
HDLC SERPL-MCNC: 41 MG/DL
LDLC SERPL CALC-MCNC: 32 MG/DL
MICROALBUMIN 24H UR DL<=1MG/L-MCNC: <1.2 MG/DL
MICROALBUMIN/CREAT 24H UR-RTO: NORMAL MG/G
NONHDLC SERPL-MCNC: 51 MG/DL
POTASSIUM SERPL-SCNC: 4.4 MMOL/L
PROT SERPL-MCNC: 7.3 G/DL
SODIUM SERPL-SCNC: 138 MMOL/L
TRIGL SERPL-MCNC: 99 MG/DL

## 2024-04-30 PROCEDURE — 82962 GLUCOSE BLOOD TEST: CPT

## 2024-04-30 PROCEDURE — 99215 OFFICE O/P EST HI 40 MIN: CPT | Mod: 25

## 2024-04-30 RX ORDER — TIRZEPATIDE 7.5 MG/.5ML
7.5 INJECTION, SOLUTION SUBCUTANEOUS
Qty: 3 | Refills: 3 | Status: ACTIVE | COMMUNITY
Start: 2023-03-31 | End: 1900-01-01

## 2024-04-30 RX ORDER — EMPAGLIFLOZIN 25 MG/1
25 TABLET, FILM COATED ORAL
Qty: 90 | Refills: 3 | Status: ACTIVE | COMMUNITY
Start: 2022-04-06 | End: 1900-01-01

## 2024-04-30 RX ORDER — LISINOPRIL 5 MG/1
5 TABLET ORAL
Qty: 90 | Refills: 3 | Status: ACTIVE | COMMUNITY
Start: 2019-05-28 | End: 1900-01-01

## 2024-04-30 NOTE — PHYSICAL EXAM
[Alert] : alert [Well Nourished] : well nourished [No Acute Distress] : no acute distress [Well Developed] : well developed [Normal Sclera/Conjunctiva] : normal sclera/conjunctiva [EOMI] : extra ocular movement intact [No Proptosis] : no proptosis [Normal Oropharynx] : the oropharynx was normal [No Respiratory Distress] : no respiratory distress [No Accessory Muscle Use] : no accessory muscle use [Clear to Auscultation] : lungs were clear to auscultation bilaterally [Normal S1, S2] : normal S1 and S2 [Normal Rate] : heart rate was normal [Regular Rhythm] : with a regular rhythm [No Edema] : no peripheral edema [Pedal Pulses Normal] : the pedal pulses are present [Normal Bowel Sounds] : normal bowel sounds [Not Tender] : non-tender [Not Distended] : not distended [Soft] : abdomen soft [Normal Anterior Cervical Nodes] : no anterior cervical lymphadenopathy [No Spinal Tenderness] : no spinal tenderness [Spine Straight] : spine straight [No Stigmata of Cushings Syndrome] : no stigmata of Cushings Syndrome [Normal Gait] : normal gait [Normal Strength/Tone] : muscle strength and tone were normal [No Rash] : no rash [Normal Reflexes] : deep tendon reflexes were 2+ and symmetric [No Tremors] : no tremors [Oriented x3] : oriented to person, place, and time

## 2024-09-09 ENCOUNTER — RX RENEWAL (OUTPATIENT)
Age: 54
End: 2024-09-09

## 2024-10-26 ENCOUNTER — LABORATORY RESULT (OUTPATIENT)
Age: 54
End: 2024-10-26

## 2024-10-28 ENCOUNTER — APPOINTMENT (OUTPATIENT)
Dept: ENDOCRINOLOGY | Facility: CLINIC | Age: 54
End: 2024-10-28
Payer: MEDICARE

## 2024-10-28 ENCOUNTER — NON-APPOINTMENT (OUTPATIENT)
Age: 54
End: 2024-10-28

## 2024-10-28 VITALS
SYSTOLIC BLOOD PRESSURE: 122 MMHG | HEART RATE: 90 BPM | BODY MASS INDEX: 24.93 KG/M2 | WEIGHT: 157 LBS | HEIGHT: 66.5 IN | OXYGEN SATURATION: 98 % | DIASTOLIC BLOOD PRESSURE: 60 MMHG

## 2024-10-28 DIAGNOSIS — E78.5 HYPERLIPIDEMIA, UNSPECIFIED: ICD-10-CM

## 2024-10-28 DIAGNOSIS — I10 ESSENTIAL (PRIMARY) HYPERTENSION: ICD-10-CM

## 2024-10-28 DIAGNOSIS — M14.60 CHARCOT'S JOINT, UNSPECIFIED SITE: ICD-10-CM

## 2024-10-28 DIAGNOSIS — E55.9 VITAMIN D DEFICIENCY, UNSPECIFIED: ICD-10-CM

## 2024-10-28 DIAGNOSIS — E11.42 TYPE 2 DIABETES MELLITUS WITH DIABETIC POLYNEUROPATHY: ICD-10-CM

## 2024-10-28 DIAGNOSIS — E66.3 OVERWEIGHT: ICD-10-CM

## 2024-10-28 DIAGNOSIS — E04.9 NONTOXIC GOITER, UNSPECIFIED: ICD-10-CM

## 2024-10-28 LAB — GLUCOSE BLDC GLUCOMTR-MCNC: 106

## 2024-10-28 PROCEDURE — G2211 COMPLEX E/M VISIT ADD ON: CPT

## 2024-10-28 PROCEDURE — 99215 OFFICE O/P EST HI 40 MIN: CPT

## 2025-05-02 ENCOUNTER — APPOINTMENT (OUTPATIENT)
Dept: ENDOCRINOLOGY | Facility: CLINIC | Age: 55
End: 2025-05-02

## 2025-05-02 ENCOUNTER — NON-APPOINTMENT (OUTPATIENT)
Age: 55
End: 2025-05-02

## 2025-05-02 VITALS
OXYGEN SATURATION: 97 % | DIASTOLIC BLOOD PRESSURE: 56 MMHG | SYSTOLIC BLOOD PRESSURE: 102 MMHG | HEIGHT: 66.5 IN | WEIGHT: 155 LBS | HEART RATE: 81 BPM | BODY MASS INDEX: 24.62 KG/M2

## 2025-05-02 DIAGNOSIS — M14.60 CHARCOT'S JOINT, UNSPECIFIED SITE: ICD-10-CM

## 2025-05-02 DIAGNOSIS — E78.5 HYPERLIPIDEMIA, UNSPECIFIED: ICD-10-CM

## 2025-05-02 DIAGNOSIS — E11.42 TYPE 2 DIABETES MELLITUS WITH DIABETIC POLYNEUROPATHY: ICD-10-CM

## 2025-05-02 DIAGNOSIS — R80.9 PROTEINURIA, UNSPECIFIED: ICD-10-CM

## 2025-05-02 DIAGNOSIS — E66.3 OVERWEIGHT: ICD-10-CM

## 2025-05-02 DIAGNOSIS — E55.9 VITAMIN D DEFICIENCY, UNSPECIFIED: ICD-10-CM

## 2025-05-02 DIAGNOSIS — Z86.39 PERSONAL HISTORY OF OTHER ENDOCRINE, NUTRITIONAL AND METABOLIC DISEASE: ICD-10-CM

## 2025-05-02 DIAGNOSIS — I10 ESSENTIAL (PRIMARY) HYPERTENSION: ICD-10-CM

## 2025-05-02 LAB — GLUCOSE BLDC GLUCOMTR-MCNC: 94

## 2025-05-02 PROCEDURE — G2211 COMPLEX E/M VISIT ADD ON: CPT

## 2025-05-02 PROCEDURE — 99215 OFFICE O/P EST HI 40 MIN: CPT

## 2025-05-02 RX ORDER — METFORMIN HYDROCHLORIDE 1000 MG/1
1000 TABLET, COATED ORAL
Qty: 60 | Refills: 3 | Status: ACTIVE | COMMUNITY
Start: 2025-05-02 | End: 1900-01-01

## 2025-05-02 RX ORDER — TIRZEPATIDE 7.5 MG/.5ML
7.5 INJECTION, SOLUTION SUBCUTANEOUS
Qty: 3 | Refills: 3 | Status: ACTIVE | COMMUNITY
Start: 2025-04-21 | End: 1900-01-01